# Patient Record
Sex: FEMALE | Race: WHITE
[De-identification: names, ages, dates, MRNs, and addresses within clinical notes are randomized per-mention and may not be internally consistent; named-entity substitution may affect disease eponyms.]

---

## 2017-01-05 ENCOUNTER — PRIOR ORIGINAL RECORDS (OUTPATIENT)
Dept: OTHER | Age: 77
End: 2017-01-05

## 2017-01-06 ENCOUNTER — PRIOR ORIGINAL RECORDS (OUTPATIENT)
Dept: OTHER | Age: 77
End: 2017-01-06

## 2017-03-06 PROBLEM — M17.11 PRIMARY OSTEOARTHRITIS OF RIGHT KNEE: Status: ACTIVE | Noted: 2017-03-06

## 2017-11-01 ENCOUNTER — PRIOR ORIGINAL RECORDS (OUTPATIENT)
Dept: OTHER | Age: 77
End: 2017-11-01

## 2017-11-02 LAB
ALBUMIN: 4.3 G/DL
ALKALINE PHOSPHATATE(ALK PHOS): 56 IU/L
ALT (SGPT): 13 U/L
AST (SGOT): 18 U/L
BILIRUBIN TOTAL: 0.6 MG/DL
BILIRUBIN TOTAL: 0.6 MG/DL
BUN: 23 MG/DL
CALCIUM: 9.7 MG/DL
CHLORIDE: 99 MEQ/L
CHOLESTEROL, TOTAL: 180 MG/DL
CREATININE, SERUM: 0.9 MG/DL
GLUCOSE: 110 MG/DL
GLUCOSE: 110 MG/DL
HDL CHOLESTEROL: 68 MG/DL
LDL CHOLESTEROL: 92 MG/DL
NON-HDL CHOLESTEROL: 112 MG/DL
POTASSIUM, SERUM: 4 MEQ/L
PROTEIN, TOTAL: 7.2 G/DL
SGOT (AST): 18 IU/L
SGPT (ALT): 13 IU/L
SODIUM: 142 MEQ/L
TRIGLYCERIDES: 102 MG/DL

## 2017-12-01 ENCOUNTER — PRIOR ORIGINAL RECORDS (OUTPATIENT)
Dept: OTHER | Age: 77
End: 2017-12-01

## 2018-08-01 PROCEDURE — 84436 ASSAY OF TOTAL THYROXINE: CPT | Performed by: INTERNAL MEDICINE

## 2018-08-13 PROBLEM — Z12.39 BREAST CANCER SCREENING: Status: ACTIVE | Noted: 2018-08-13

## 2018-08-13 PROBLEM — Z01.419 WELL WOMAN EXAM WITH ROUTINE GYNECOLOGICAL EXAM: Status: ACTIVE | Noted: 2018-08-13

## 2018-10-01 PROBLEM — M26.621 ARTHRALGIA OF RIGHT TEMPOROMANDIBULAR JOINT: Status: ACTIVE | Noted: 2018-10-01

## 2018-11-19 ENCOUNTER — PRIOR ORIGINAL RECORDS (OUTPATIENT)
Dept: OTHER | Age: 78
End: 2018-11-19

## 2018-11-23 LAB
ALBUMIN: 4.4 G/DL
ALKALINE PHOSPHATATE(ALK PHOS): 53 IU/L
ALT (SGPT): 15 U/L
AST (SGOT): 17 U/L
BILIRUBIN TOTAL: 0.8 MG/DL
BUN: 25 MG/DL
CALCIUM: 9.8 MG/DL
CHLORIDE: 101 MEQ/L
CHOLESTEROL, TOTAL: 172 MG/DL
CREATININE, SERUM: 0.8 MG/DL
GLUCOSE: 95 MG/DL
GLUCOSE: 95 MG/DL
HDL CHOLESTEROL: 73 MG/DL
LDL CHOLESTEROL: 84 MG/DL
NON-HDL CHOLESTEROL: 99 MG/DL
POTASSIUM, SERUM: 3.8 MEQ/L
PROTEIN, TOTAL: 7 G/DL
SGOT (AST): 17 IU/L
SGPT (ALT): 15 IU/L
SODIUM: 141 MEQ/L
TRIGLYCERIDES: 77 MG/DL

## 2018-11-30 ENCOUNTER — PRIOR ORIGINAL RECORDS (OUTPATIENT)
Dept: OTHER | Age: 78
End: 2018-11-30

## 2018-11-30 ENCOUNTER — MYAURORA ACCOUNT LINK (OUTPATIENT)
Dept: OTHER | Age: 78
End: 2018-11-30

## 2018-12-01 ENCOUNTER — EXTERNAL RECORD (OUTPATIENT)
Dept: HEALTH INFORMATION MANAGEMENT | Facility: OTHER | Age: 78
End: 2018-12-01

## 2018-12-01 ENCOUNTER — PRIOR ORIGINAL RECORDS (OUTPATIENT)
Dept: HEALTH INFORMATION MANAGEMENT | Facility: OTHER | Age: 78
End: 2018-12-01

## 2019-02-12 PROBLEM — K21.00 GASTROESOPHAGEAL REFLUX DISEASE WITH ESOPHAGITIS: Status: ACTIVE | Noted: 2019-02-12

## 2019-02-12 PROBLEM — R42 VERTIGO: Status: ACTIVE | Noted: 2019-02-12

## 2019-02-28 VITALS
HEIGHT: 67 IN | BODY MASS INDEX: 26.68 KG/M2 | SYSTOLIC BLOOD PRESSURE: 126 MMHG | WEIGHT: 170 LBS | HEART RATE: 68 BPM | DIASTOLIC BLOOD PRESSURE: 74 MMHG

## 2019-02-28 VITALS
DIASTOLIC BLOOD PRESSURE: 72 MMHG | BODY MASS INDEX: 27 KG/M2 | WEIGHT: 172 LBS | HEART RATE: 72 BPM | SYSTOLIC BLOOD PRESSURE: 124 MMHG | HEIGHT: 67 IN

## 2019-03-21 RX ORDER — ROSUVASTATIN CALCIUM 5 MG/1
TABLET, COATED ORAL
Qty: 90 TABLET | Refills: 3 | Status: SHIPPED | OUTPATIENT
Start: 2019-03-21 | End: 2020-06-05

## 2019-04-19 RX ORDER — AMLODIPINE BESYLATE 10 MG/1
TABLET ORAL
COMMUNITY
Start: 2013-09-06

## 2019-04-19 RX ORDER — LORATADINE 10 MG/1
TABLET ORAL
COMMUNITY

## 2019-10-25 ENCOUNTER — TELEPHONE (OUTPATIENT)
Dept: CARDIOLOGY | Age: 79
End: 2019-10-25

## 2019-10-25 DIAGNOSIS — I49.3 PVC'S (PREMATURE VENTRICULAR CONTRACTIONS): Primary | ICD-10-CM

## 2019-10-29 ENCOUNTER — LAB ENCOUNTER (OUTPATIENT)
Dept: LAB | Facility: HOSPITAL | Age: 79
End: 2019-10-29
Attending: INTERNAL MEDICINE
Payer: MEDICARE

## 2019-10-29 ENCOUNTER — ANCILLARY PROCEDURE (OUTPATIENT)
Dept: CARDIOLOGY | Age: 79
End: 2019-10-29
Attending: INTERNAL MEDICINE

## 2019-10-29 DIAGNOSIS — E78.2 MIXED HYPERLIPIDEMIA: ICD-10-CM

## 2019-10-29 DIAGNOSIS — I49.3 VENTRICULAR PREMATURE BEATS: Primary | ICD-10-CM

## 2019-10-29 DIAGNOSIS — M81.8 OTHER OSTEOPOROSIS, UNSPECIFIED PATHOLOGICAL FRACTURE PRESENCE: ICD-10-CM

## 2019-10-29 DIAGNOSIS — I10 ESSENTIAL HYPERTENSION: ICD-10-CM

## 2019-10-29 DIAGNOSIS — E07.1 DYSHORMONOGENIC GOITER: ICD-10-CM

## 2019-10-29 DIAGNOSIS — R73.01 IMPAIRED FASTING GLUCOSE: ICD-10-CM

## 2019-10-29 PROCEDURE — 83735 ASSAY OF MAGNESIUM: CPT

## 2019-10-29 PROCEDURE — 80048 BASIC METABOLIC PNL TOTAL CA: CPT

## 2019-10-29 PROCEDURE — 36415 COLL VENOUS BLD VENIPUNCTURE: CPT

## 2019-11-06 ENCOUNTER — TELEPHONE (OUTPATIENT)
Dept: CARDIOLOGY | Age: 79
End: 2019-11-06

## 2019-11-06 DIAGNOSIS — E78.2 MIXED HYPERLIPIDEMIA: ICD-10-CM

## 2019-11-06 DIAGNOSIS — I49.3 PVC'S (PREMATURE VENTRICULAR CONTRACTIONS): Primary | ICD-10-CM

## 2019-11-06 DIAGNOSIS — I10 HYPERTENSION, UNSPECIFIED TYPE: ICD-10-CM

## 2019-11-06 DIAGNOSIS — E78.00 HYPERCHOLESTEREMIA: ICD-10-CM

## 2019-11-06 DIAGNOSIS — E07.1 DYSHORMONOGENIC GOITER: ICD-10-CM

## 2019-11-06 DIAGNOSIS — R73.01 IMPAIRED FASTING GLUCOSE: ICD-10-CM

## 2019-11-06 DIAGNOSIS — I34.1 MITRAL VALVE PROLAPSE: ICD-10-CM

## 2019-11-06 DIAGNOSIS — M81.8 OTHER OSTEOPOROSIS, UNSPECIFIED PATHOLOGICAL FRACTURE PRESENCE: ICD-10-CM

## 2019-11-06 DIAGNOSIS — I10 ESSENTIAL HYPERTENSION: ICD-10-CM

## 2019-11-06 PROCEDURE — 93227 XTRNL ECG REC<48 HR R&I: CPT | Performed by: INTERNAL MEDICINE

## 2019-11-13 ENCOUNTER — HOSPITAL ENCOUNTER (OUTPATIENT)
Dept: CV DIAGNOSTICS | Facility: HOSPITAL | Age: 79
Discharge: HOME | End: 2019-11-13
Attending: INTERNAL MEDICINE
Payer: MEDICARE

## 2019-11-13 DIAGNOSIS — I10 HYPERTENSION, UNSPECIFIED TYPE: ICD-10-CM

## 2019-11-13 DIAGNOSIS — I49.3 PVC'S (PREMATURE VENTRICULAR CONTRACTIONS): ICD-10-CM

## 2019-11-13 DIAGNOSIS — I34.1 MITRAL VALVE PROLAPSE: ICD-10-CM

## 2019-11-13 PROCEDURE — 93306 TTE W/DOPPLER COMPLETE: CPT | Performed by: INTERNAL MEDICINE

## 2019-11-14 ENCOUNTER — TELEPHONE (OUTPATIENT)
Dept: CARDIOLOGY | Age: 79
End: 2019-11-14

## 2019-12-04 ENCOUNTER — TELEPHONE (OUTPATIENT)
Dept: CARDIOLOGY | Age: 79
End: 2019-12-04

## 2019-12-06 ENCOUNTER — OFFICE VISIT (OUTPATIENT)
Dept: CARDIOLOGY | Age: 79
End: 2019-12-06

## 2019-12-06 VITALS
WEIGHT: 170 LBS | HEART RATE: 68 BPM | BODY MASS INDEX: 26.68 KG/M2 | DIASTOLIC BLOOD PRESSURE: 70 MMHG | SYSTOLIC BLOOD PRESSURE: 142 MMHG | HEIGHT: 67 IN

## 2019-12-06 DIAGNOSIS — I34.0 NONRHEUMATIC MITRAL VALVE REGURGITATION: Primary | ICD-10-CM

## 2019-12-06 DIAGNOSIS — E78.2 MIXED HYPERLIPIDEMIA: ICD-10-CM

## 2019-12-06 DIAGNOSIS — I10 ESSENTIAL HYPERTENSION: ICD-10-CM

## 2019-12-06 DIAGNOSIS — I49.3 PVC (PREMATURE VENTRICULAR CONTRACTION): ICD-10-CM

## 2019-12-06 DIAGNOSIS — I34.1 MITRAL VALVE PROLAPSE: ICD-10-CM

## 2019-12-06 PROCEDURE — 99214 OFFICE O/P EST MOD 30 MIN: CPT | Performed by: INTERNAL MEDICINE

## 2019-12-06 ASSESSMENT — PATIENT HEALTH QUESTIONNAIRE - PHQ9
2. FEELING DOWN, DEPRESSED OR HOPELESS: NOT AT ALL
SUM OF ALL RESPONSES TO PHQ9 QUESTIONS 1 AND 2: 0
SUM OF ALL RESPONSES TO PHQ9 QUESTIONS 1 AND 2: 0
1. LITTLE INTEREST OR PLEASURE IN DOING THINGS: NOT AT ALL

## 2019-12-09 ASSESSMENT — ENCOUNTER SYMPTOMS
ALLERGIC/IMMUNOLOGIC COMMENTS: NO NEW FOOD ALLERGIES
BACK PAIN: 1
WEIGHT GAIN: 0
COUGH: 0
HEMATOCHEZIA: 0
CHILLS: 0
HEMOPTYSIS: 0
SUSPICIOUS LESIONS: 0
BRUISES/BLEEDS EASILY: 0
FEVER: 0
WEIGHT LOSS: 0

## 2020-06-05 RX ORDER — ROSUVASTATIN CALCIUM 5 MG/1
TABLET, COATED ORAL
Qty: 90 TABLET | Refills: 2 | Status: SHIPPED | OUTPATIENT
Start: 2020-06-05

## 2020-08-27 ENCOUNTER — TELEPHONE (OUTPATIENT)
Dept: CARDIOLOGY | Age: 80
End: 2020-08-27

## 2020-08-31 ENCOUNTER — TELEPHONE (OUTPATIENT)
Dept: CARDIOLOGY | Age: 80
End: 2020-08-31

## 2020-08-31 DIAGNOSIS — R00.1 BRADYCARDIA, UNSPECIFIED: Primary | ICD-10-CM

## 2020-08-31 PROCEDURE — 93000 ELECTROCARDIOGRAM COMPLETE: CPT | Performed by: INTERNAL MEDICINE

## 2020-09-01 ENCOUNTER — ANCILLARY PROCEDURE (OUTPATIENT)
Dept: CARDIOLOGY | Age: 80
End: 2020-09-01
Attending: INTERNAL MEDICINE

## 2020-09-01 DIAGNOSIS — R00.1 BRADYCARDIA, UNSPECIFIED: ICD-10-CM

## 2020-09-04 ENCOUNTER — TELEPHONE (OUTPATIENT)
Dept: CARDIOLOGY | Age: 80
End: 2020-09-04

## 2020-09-04 PROCEDURE — 93227 XTRNL ECG REC<48 HR R&I: CPT | Performed by: INTERNAL MEDICINE

## 2020-09-08 ENCOUNTER — TELEPHONE (OUTPATIENT)
Dept: CARDIOLOGY | Age: 80
End: 2020-09-08

## 2020-09-08 DIAGNOSIS — I10 ESSENTIAL HYPERTENSION: Primary | ICD-10-CM

## 2020-09-08 DIAGNOSIS — I49.3 PVC (PREMATURE VENTRICULAR CONTRACTION): ICD-10-CM

## 2020-09-08 DIAGNOSIS — I34.0 NONRHEUMATIC MITRAL VALVE REGURGITATION: ICD-10-CM

## 2020-09-08 DIAGNOSIS — I34.1 MITRAL VALVE PROLAPSE: ICD-10-CM

## 2020-09-16 ENCOUNTER — HOSPITAL ENCOUNTER (OUTPATIENT)
Dept: CV DIAGNOSTICS | Facility: HOSPITAL | Age: 80
Discharge: HOME | End: 2020-09-16
Attending: INTERNAL MEDICINE
Payer: MEDICARE

## 2020-09-16 DIAGNOSIS — I49.3 PVC (PREMATURE VENTRICULAR CONTRACTION): ICD-10-CM

## 2020-09-16 DIAGNOSIS — I34.0 NONRHEUMATIC MITRAL VALVE REGURGITATION: ICD-10-CM

## 2020-09-16 DIAGNOSIS — I34.1 MITRAL VALVE PROLAPSE: ICD-10-CM

## 2020-09-16 DIAGNOSIS — I10 HYPERTENSION, ESSENTIAL: ICD-10-CM

## 2020-09-16 PROCEDURE — 93306 TTE W/DOPPLER COMPLETE: CPT | Performed by: INTERNAL MEDICINE

## 2020-09-21 ENCOUNTER — TELEPHONE (OUTPATIENT)
Dept: CARDIOLOGY | Age: 80
End: 2020-09-21

## 2020-09-21 DIAGNOSIS — I49.3 PVC (PREMATURE VENTRICULAR CONTRACTION): Primary | ICD-10-CM

## 2020-09-21 RX ORDER — METOPROLOL SUCCINATE 25 MG/1
12.5 TABLET, EXTENDED RELEASE ORAL DAILY
Qty: 45 TABLET | Refills: 1 | Status: SHIPPED | OUTPATIENT
Start: 2020-09-21 | End: 2021-02-04

## 2020-11-17 ENCOUNTER — TELEPHONE (OUTPATIENT)
Dept: CARDIOLOGY | Age: 80
End: 2020-11-17

## 2020-11-17 DIAGNOSIS — I10 HYPERTENSION, UNSPECIFIED TYPE: Primary | ICD-10-CM

## 2020-11-17 DIAGNOSIS — I34.1 MITRAL VALVE PROLAPSE: ICD-10-CM

## 2020-11-17 DIAGNOSIS — E78.00 HYPERCHOLESTEREMIA: ICD-10-CM

## 2020-11-17 DIAGNOSIS — I49.3 PVC'S (PREMATURE VENTRICULAR CONTRACTIONS): ICD-10-CM

## 2020-12-08 ENCOUNTER — APPOINTMENT (OUTPATIENT)
Dept: CARDIOLOGY | Age: 80
End: 2020-12-08
Attending: INTERNAL MEDICINE

## 2020-12-18 ENCOUNTER — APPOINTMENT (OUTPATIENT)
Dept: CARDIOLOGY | Age: 80
End: 2020-12-18

## 2020-12-22 PROBLEM — I34.0 NONRHEUMATIC MITRAL VALVE REGURGITATION: Status: ACTIVE | Noted: 2019-12-06

## 2021-01-05 ENCOUNTER — ANCILLARY PROCEDURE (OUTPATIENT)
Dept: CARDIOLOGY | Age: 81
End: 2021-01-05
Attending: INTERNAL MEDICINE

## 2021-01-05 DIAGNOSIS — I49.3 PVC (PREMATURE VENTRICULAR CONTRACTION): ICD-10-CM

## 2021-01-07 PROCEDURE — 93227 XTRNL ECG REC<48 HR R&I: CPT | Performed by: INTERNAL MEDICINE

## 2021-01-13 RX ORDER — OMEPRAZOLE 20 MG/1
20 CAPSULE, DELAYED RELEASE ORAL DAILY
COMMUNITY
Start: 2020-12-12

## 2021-01-15 ENCOUNTER — OFFICE VISIT (OUTPATIENT)
Dept: CARDIOLOGY | Age: 81
End: 2021-01-15

## 2021-01-15 VITALS
BODY MASS INDEX: 28.16 KG/M2 | HEART RATE: 50 BPM | SYSTOLIC BLOOD PRESSURE: 122 MMHG | DIASTOLIC BLOOD PRESSURE: 60 MMHG | WEIGHT: 169 LBS | HEIGHT: 65 IN

## 2021-01-15 DIAGNOSIS — I34.0 NONRHEUMATIC MITRAL VALVE REGURGITATION: ICD-10-CM

## 2021-01-15 DIAGNOSIS — I10 ESSENTIAL HYPERTENSION: Primary | ICD-10-CM

## 2021-01-15 DIAGNOSIS — E78.2 MIXED HYPERLIPIDEMIA: ICD-10-CM

## 2021-01-15 DIAGNOSIS — I49.3 FREQUENT PVCS: ICD-10-CM

## 2021-01-15 DIAGNOSIS — I34.1 MITRAL VALVE PROLAPSE: ICD-10-CM

## 2021-01-15 PROCEDURE — 99215 OFFICE O/P EST HI 40 MIN: CPT | Performed by: INTERNAL MEDICINE

## 2021-01-15 SDOH — HEALTH STABILITY: MENTAL HEALTH: HOW OFTEN DO YOU HAVE A DRINK CONTAINING ALCOHOL?: MONTHLY OR LESS

## 2021-01-15 SDOH — HEALTH STABILITY: PHYSICAL HEALTH: ON AVERAGE, HOW MANY DAYS PER WEEK DO YOU ENGAGE IN MODERATE TO STRENUOUS EXERCISE (LIKE A BRISK WALK)?: 0 DAYS

## 2021-01-15 SDOH — HEALTH STABILITY: MENTAL HEALTH: HOW MANY STANDARD DRINKS CONTAINING ALCOHOL DO YOU HAVE ON A TYPICAL DAY?: 1 OR 2

## 2021-01-15 SDOH — HEALTH STABILITY: PHYSICAL HEALTH: ON AVERAGE, HOW MANY MINUTES DO YOU ENGAGE IN EXERCISE AT THIS LEVEL?: 0 MIN

## 2021-01-15 ASSESSMENT — ENCOUNTER SYMPTOMS
WEIGHT LOSS: 0
WEIGHT GAIN: 0
FEVER: 0
BRUISES/BLEEDS EASILY: 0
HEMATOCHEZIA: 0
SUSPICIOUS LESIONS: 0
CHILLS: 0
ALLERGIC/IMMUNOLOGIC COMMENTS: NO NEW FOOD ALLERGIES
COUGH: 0
HEMOPTYSIS: 0

## 2021-01-15 ASSESSMENT — PATIENT HEALTH QUESTIONNAIRE - PHQ9
2. FEELING DOWN, DEPRESSED OR HOPELESS: NOT AT ALL
1. LITTLE INTEREST OR PLEASURE IN DOING THINGS: NOT AT ALL
SUM OF ALL RESPONSES TO PHQ9 QUESTIONS 1 AND 2: 0
SUM OF ALL RESPONSES TO PHQ9 QUESTIONS 1 AND 2: 0
1. LITTLE INTEREST OR PLEASURE IN DOING THINGS: NOT AT ALL
2. FEELING DOWN, DEPRESSED OR HOPELESS: NOT AT ALL
SUM OF ALL RESPONSES TO PHQ9 QUESTIONS 1 AND 2: 0
CLINICAL INTERPRETATION OF PHQ2 SCORE: NO FURTHER SCREENING NEEDED
CLINICAL INTERPRETATION OF PHQ9 SCORE: NO FURTHER SCREENING NEEDED
CLINICAL INTERPRETATION OF PHQ2 SCORE: NO FURTHER SCREENING NEEDED

## 2021-01-18 ENCOUNTER — TELEPHONE (OUTPATIENT)
Dept: CARDIOLOGY | Age: 81
End: 2021-01-18

## 2021-02-04 RX ORDER — METOPROLOL SUCCINATE 25 MG/1
TABLET, EXTENDED RELEASE ORAL
Qty: 45 TABLET | Refills: 3 | Status: SHIPPED | OUTPATIENT
Start: 2021-02-04

## 2021-03-18 ENCOUNTER — IMMUNIZATION (OUTPATIENT)
Dept: LAB | Age: 81
End: 2021-03-18

## 2021-03-18 DIAGNOSIS — Z23 NEED FOR VACCINATION: Primary | ICD-10-CM

## 2021-03-18 PROCEDURE — 91300 COVID 19 PFIZER-BIONTECH: CPT

## 2021-03-18 PROCEDURE — 0001A COVID 19 PFIZER-BIONTECH: CPT

## 2021-03-30 PROBLEM — I70.203 ATHEROSCLEROSIS OF NATIVE ARTERY OF BOTH LOWER EXTREMITIES, WITH UNSPECIFIED PRESENCE OF CLINICAL MANIFESTATION (HCC): Status: ACTIVE | Noted: 2021-03-30

## 2021-03-30 PROBLEM — I70.203 ATHEROSCLEROSIS OF NATIVE ARTERY OF BOTH LOWER EXTREMITIES, WITH UNSPECIFIED PRESENCE OF CLINICAL MANIFESTATION: Status: ACTIVE | Noted: 2021-03-30

## 2021-03-30 PROBLEM — M46.02 SPINAL ENTHESOPATHY, CERVICAL REGION: Status: ACTIVE | Noted: 2021-03-30

## 2021-03-30 PROBLEM — M46.02: Status: ACTIVE | Noted: 2021-03-30

## 2021-04-08 ENCOUNTER — IMMUNIZATION (OUTPATIENT)
Dept: LAB | Age: 81
End: 2021-04-08
Attending: HOSPITALIST

## 2021-04-08 DIAGNOSIS — Z23 NEED FOR VACCINATION: Primary | ICD-10-CM

## 2021-04-08 PROCEDURE — 91300 COVID 19 PFIZER-BIONTECH: CPT

## 2021-04-08 PROCEDURE — 0002A COVID 19 PFIZER-BIONTECH: CPT

## 2021-10-05 PROBLEM — E04.1 NODULAR THYROID DISEASE: Status: ACTIVE | Noted: 2021-10-05

## 2021-10-11 PROBLEM — I63.9 ACUTE ISCHEMIC STROKE (HCC): Status: ACTIVE | Noted: 2021-09-29

## 2021-10-11 PROBLEM — G81.91 ACUTE RIGHT HEMIPARESIS (HCC): Status: ACTIVE | Noted: 2021-09-29

## 2021-10-11 PROBLEM — I10 ACCELERATED HYPERTENSION: Status: ACTIVE | Noted: 2021-09-29

## 2021-10-14 PROBLEM — I48.91 ATRIAL FIBRILLATION, UNSPECIFIED TYPE (HCC): Status: ACTIVE | Noted: 2021-10-14

## 2021-11-23 ENCOUNTER — LAB ENCOUNTER (OUTPATIENT)
Dept: LAB | Facility: HOSPITAL | Age: 81
End: 2021-11-23
Attending: NURSE PRACTITIONER
Payer: MEDICARE

## 2021-11-23 DIAGNOSIS — E78.2 MIXED HYPERLIPIDEMIA: Primary | ICD-10-CM

## 2021-11-23 PROCEDURE — 80053 COMPREHEN METABOLIC PANEL: CPT

## 2021-11-23 PROCEDURE — 36415 COLL VENOUS BLD VENIPUNCTURE: CPT

## 2021-11-23 PROCEDURE — 80061 LIPID PANEL: CPT

## 2021-11-23 PROCEDURE — 85025 COMPLETE CBC W/AUTO DIFF WBC: CPT

## 2021-12-05 ENCOUNTER — HOSPITAL ENCOUNTER (INPATIENT)
Facility: HOSPITAL | Age: 81
LOS: 1 days | Discharge: HOME | DRG: 308 | End: 2021-12-09
Attending: EMERGENCY MEDICINE | Admitting: INTERNAL MEDICINE
Payer: MEDICARE

## 2021-12-05 ENCOUNTER — APPOINTMENT (OUTPATIENT)
Dept: GENERAL RADIOLOGY | Facility: HOSPITAL | Age: 81
DRG: 308 | End: 2021-12-05
Attending: EMERGENCY MEDICINE
Payer: MEDICARE

## 2021-12-05 DIAGNOSIS — R53.1 WEAKNESS GENERALIZED: ICD-10-CM

## 2021-12-05 DIAGNOSIS — I48.91 ATRIAL FIBRILLATION WITH RAPID VENTRICULAR RESPONSE (HCC): Primary | ICD-10-CM

## 2021-12-05 PROBLEM — R79.89 AZOTEMIA: Status: ACTIVE | Noted: 2021-12-05

## 2021-12-05 PROBLEM — E87.6 HYPOKALEMIA: Status: ACTIVE | Noted: 2021-12-05

## 2021-12-05 PROBLEM — R73.9 HYPERGLYCEMIA: Status: ACTIVE | Noted: 2021-12-05

## 2021-12-05 PROCEDURE — 96365 THER/PROPH/DIAG IV INF INIT: CPT

## 2021-12-05 PROCEDURE — 93010 ELECTROCARDIOGRAM REPORT: CPT

## 2021-12-05 PROCEDURE — 71045 X-RAY EXAM CHEST 1 VIEW: CPT | Performed by: EMERGENCY MEDICINE

## 2021-12-05 PROCEDURE — 84443 ASSAY THYROID STIM HORMONE: CPT | Performed by: EMERGENCY MEDICINE

## 2021-12-05 PROCEDURE — 81001 URINALYSIS AUTO W/SCOPE: CPT | Performed by: EMERGENCY MEDICINE

## 2021-12-05 PROCEDURE — 93005 ELECTROCARDIOGRAM TRACING: CPT

## 2021-12-05 PROCEDURE — 96366 THER/PROPH/DIAG IV INF ADDON: CPT

## 2021-12-05 PROCEDURE — 99285 EMERGENCY DEPT VISIT HI MDM: CPT

## 2021-12-05 PROCEDURE — 85025 COMPLETE CBC W/AUTO DIFF WBC: CPT | Performed by: EMERGENCY MEDICINE

## 2021-12-05 PROCEDURE — 84484 ASSAY OF TROPONIN QUANT: CPT | Performed by: EMERGENCY MEDICINE

## 2021-12-05 PROCEDURE — 80053 COMPREHEN METABOLIC PANEL: CPT | Performed by: EMERGENCY MEDICINE

## 2021-12-05 RX ORDER — ASPIRIN 81 MG/1
81 TABLET ORAL DAILY
COMMUNITY

## 2021-12-05 RX ORDER — ASPIRIN 81 MG/1
81 TABLET ORAL DAILY
Status: DISCONTINUED | OUTPATIENT
Start: 2021-12-05 | End: 2021-12-09

## 2021-12-05 RX ORDER — ACETAMINOPHEN 325 MG/1
650 TABLET ORAL EVERY 6 HOURS PRN
Status: DISCONTINUED | OUTPATIENT
Start: 2021-12-05 | End: 2021-12-09

## 2021-12-05 RX ORDER — POTASSIUM CHLORIDE 20 MEQ/1
40 TABLET, EXTENDED RELEASE ORAL ONCE
Status: COMPLETED | OUTPATIENT
Start: 2021-12-05 | End: 2021-12-05

## 2021-12-05 RX ORDER — PANTOPRAZOLE SODIUM 20 MG/1
20 TABLET, DELAYED RELEASE ORAL
Status: DISCONTINUED | OUTPATIENT
Start: 2021-12-05 | End: 2021-12-09

## 2021-12-05 RX ORDER — DULOXETIN HYDROCHLORIDE 30 MG/1
60 CAPSULE, DELAYED RELEASE ORAL DAILY
Status: DISCONTINUED | OUTPATIENT
Start: 2021-12-05 | End: 2021-12-09

## 2021-12-05 RX ORDER — DILTIAZEM HYDROCHLORIDE 120 MG/1
120 CAPSULE, EXTENDED RELEASE ORAL DAILY
COMMUNITY
End: 2021-12-09

## 2021-12-05 RX ORDER — ROSUVASTATIN CALCIUM 10 MG/1
10 TABLET, COATED ORAL NIGHTLY
Status: DISCONTINUED | OUTPATIENT
Start: 2021-12-05 | End: 2021-12-09

## 2021-12-05 NOTE — ED INITIAL ASSESSMENT (HPI)
A/O x 4. Patient presents with generalized weakness over the last week. Patient states she had a stroke in September with no deficits. Denies any pain.

## 2021-12-05 NOTE — ED QUICK NOTES
Ambulated patient to and from BR. Unable to void. C/o being weak. Upon returning to bed, heart rate was noted to be 120-130s.

## 2021-12-05 NOTE — ED PROVIDER NOTES
Patient Seen in: [facility] Emergency Department      History   Patient presents with:  Fatigue    Stated Complaint[facility] feels very weak[facility]     Subjective:   HPI    Patient is a 19-year-old female history of paroxysmal atrial fibrilla noted in HPI. Constitutional and vital signs reviewed. All other systems reviewed and negative except as noted above.     Physical Exam     ED Triage Vitals [12/05/21 0610]   BP (!) 161/98   Pulse (!) 137   Resp 20   Temp 97.7 °F (36.5 °C)   Temp src Narrative: The following orders were created for panel order CBC With Differential With Platelet.   Procedure                               Abnormality         Status                     ---------                               -----------         ------ interpretation of laboratory values, and discussion of case with physician and consultants.   Admission disposition: 12/5/2021  7:12 AM                                  Disposition and Plan     Clinical Impression:  Atrial fibrillation with rapid ventricula

## 2021-12-05 NOTE — H&P
[facility] Hospitalist History and Physical      Patient presents with:  Fatigue       PCP: Ortiz Turner MD      History of Present Illness: Patient is a 80year old female with PMH sig for HTN, HLD, frequent PVCs, mitral regurg, CVA with R h current facility-administered medications on file prior to encounter. aspirin 81 MG Oral Tab EC, Take 81 mg by mouth daily. , Disp: , Rfl:   dilTIAZem HCl ER Beads (TIADYLT ER) 120 MG Oral Capsule SR 24 Hr, Take 120 mg by mouth daily. , Disp: , Rfl:   rosuv focal neurologic deficits  HEENT:  EOMI, PERRLA, OP clear, MMM  Pulm: Lungs clear bilaterally, normal respiratory effort  CV: Tachy, irregular, no murmur. Normal PMI.     Abd: Abdomen soft, nontender, nondistended, no organomegaly, bowel sounds present  MS PMH sig for HTN, HLD, frequent PVCs, mitral regurg, CVA with R hemiparesis, a-fib, and PAD who presented to the ED for further evaluation of generalized fatigue.     # A-fib with RVR  - unclear trigger for uncontrolled rate  - cont cardizem gtt  - cont eliq

## 2021-12-05 NOTE — ED QUICK NOTES
Orders for admission, patient is aware of plan and ready to go upstairs. Any questions, please call ED RN Valdo Salcedo  at extension [phone]. Vaccinated? Yes  Type of COVID test sent:  COVID Suspicion level: Low      Titratable drug(s) infusing:Cardizem  Rate

## 2021-12-05 NOTE — PROGRESS NOTES
12/05/21 0955 12/05/21 0957 12/05/21 0958   Vital Signs   BP (!) 153/102 (!) 167/150 (!) 181/169   MAP (mmHg) 115 153 172   BP Location Left arm Left arm Left arm   BP Method Automatic Automatic Automatic   Patient Position Lying Sitting Sitting      12

## 2021-12-05 NOTE — PROGRESS NOTES
Received pt from ED around 0945 this AM. Patient A&O x4, wears glasses.  at bedside. Admission navigator complete. SPO2 maintained on RA, pt reports mild KIM when ambulating to bathroom. Afib on tele, HR controlled on cardizem gtt. On Eliquis.  Pt de

## 2021-12-06 PROCEDURE — 84132 ASSAY OF SERUM POTASSIUM: CPT | Performed by: INTERNAL MEDICINE

## 2021-12-06 PROCEDURE — 97161 PT EVAL LOW COMPLEX 20 MIN: CPT

## 2021-12-06 PROCEDURE — 80048 BASIC METABOLIC PNL TOTAL CA: CPT | Performed by: INTERNAL MEDICINE

## 2021-12-06 PROCEDURE — 83735 ASSAY OF MAGNESIUM: CPT | Performed by: INTERNAL MEDICINE

## 2021-12-06 PROCEDURE — 85025 COMPLETE CBC W/AUTO DIFF WBC: CPT | Performed by: INTERNAL MEDICINE

## 2021-12-06 PROCEDURE — 97166 OT EVAL MOD COMPLEX 45 MIN: CPT

## 2021-12-06 PROCEDURE — 97116 GAIT TRAINING THERAPY: CPT

## 2021-12-06 PROCEDURE — 97530 THERAPEUTIC ACTIVITIES: CPT

## 2021-12-06 RX ORDER — DILTIAZEM HYDROCHLORIDE 180 MG/1
180 CAPSULE, COATED, EXTENDED RELEASE ORAL DAILY
Qty: 90 CAPSULE | Refills: 1 | Status: SHIPPED | OUTPATIENT
Start: 2021-12-07 | End: 2021-12-09

## 2021-12-06 RX ORDER — DIGOXIN 125 MCG
125 TABLET ORAL DAILY
Qty: 30 TABLET | Refills: 0 | Status: SHIPPED | OUTPATIENT
Start: 2021-12-07

## 2021-12-06 RX ORDER — POTASSIUM CHLORIDE 20 MEQ/1
40 TABLET, EXTENDED RELEASE ORAL EVERY 4 HOURS
Status: COMPLETED | OUTPATIENT
Start: 2021-12-06 | End: 2021-12-06

## 2021-12-06 RX ORDER — DILTIAZEM HYDROCHLORIDE 180 MG/1
180 CAPSULE, EXTENDED RELEASE ORAL DAILY
Status: DISCONTINUED | OUTPATIENT
Start: 2021-12-06 | End: 2021-12-07

## 2021-12-06 RX ORDER — DIGOXIN 125 MCG
125 TABLET ORAL DAILY
Status: DISCONTINUED | OUTPATIENT
Start: 2021-12-07 | End: 2021-12-09

## 2021-12-06 RX ORDER — DIGOXIN 0.25 MG/ML
250 INJECTION INTRAMUSCULAR; INTRAVENOUS ONCE
Status: COMPLETED | OUTPATIENT
Start: 2021-12-06 | End: 2021-12-06

## 2021-12-06 RX ORDER — BENZONATATE 100 MG/1
100 CAPSULE ORAL 3 TIMES DAILY PRN
Status: DISCONTINUED | OUTPATIENT
Start: 2021-12-06 | End: 2021-12-09

## 2021-12-06 NOTE — PROGRESS NOTES
[facility] Hospitalist Progress Note     Mony Negrete Patient Status:  Observation    1940 MRN [id number]   [facility] 2NE-A Attending Angelita Fabian,    Hosp Day # 0 PCP Ingrid Bermudez MD     Sub hours. Cardiac  Recent Labs   Lab 12/05/21  0617   TROP <0.045       Creatinine Kinase  No results for input(s): CK in the last 168 hours. Inflammatory Markers  No results for input(s): CRP, BRODERICK, LDH, DDIMER in the last 168 hours.     Imaging: Imaging

## 2021-12-06 NOTE — OCCUPATIONAL THERAPY NOTE
OCCUPATIONAL THERAPY EVALUATION - INPATIENT     Room Number: 1394/4019-B  Evaluation Date: 12/6/2021  Type of Evaluation: Initial  Presenting Problem: weakness     Physician Order: IP Consult to Occupational Therapy  Reason for Therapy: ADL/IADL Dysfunctio Toileting: not tested as pt declined because she just went to restroom prior to session.  Pt reported no issues or assist needed    Functional Mobility:  Supine to Sit : Supervision  Sit to Stand: Contact guard assist  Sit to supine: supervision; pt also OBJECTIVE  Precautions: Bed/chair alarm  Fall Risk: Standard fall risk    COGNITION  Overall Cognitive Status:  WFL - within functional limits  Following Commands:  follows all commands and directions without difficulty  Safety Judgement:  good awarene use during ADL's by discharge for improved activity tolerance. Pt will tolerate standing at sink for 5 minutes to complete handwashing routine with 1 rest break as needed for improved activity tolerance.      Writer PPE: Surgical mask, goggles, and gloves

## 2021-12-06 NOTE — PHYSICAL THERAPY NOTE
PHYSICAL THERAPY EVALUATION - INPATIENT     Room Number: 0091/2319-B  Evaluation Date: 12/6/2021  Type of Evaluation: Initial  Physician Order: PT Eval and Treat    Presenting Problem: Increased weakness and SOB over past week  Co-Morbidities : Afib, OBJECTIVE  Precautions: Bed/chair alarm  Fall Risk: Standard fall risk    PAIN ASSESSMENT  Ratin          COGNITION  · Overall Cognitive Status:  WFL - within functional limits    RANGE OF MOTION AND STRENGTH ASSESSMENT  See OT note for UE assessme EOB and sat with supervision. Pt preformed sitting EOB to supine with supervision.      Exercise/Education Provided:  Bed mobility  Body mechanics  Energy conservation  Functional activity tolerated  Gait training  Strengthening  Transfer training    Patien ambulate 150 feet with assist device: cane - straight at assistance level: supervision     Goal #4 Pt able to complete 1 flight of stairs safely.     Goal #5    Goal #6    Goal Comments: Goals established on 12/6/2021    PPE worn: gloves, goggles, droplet m

## 2021-12-06 NOTE — CONSULTS
[facility]  Consultation    Vinay Lopez Patient Status:  Observation    1940 MRN [id number]   [facility] 2NE-A Attending José Manuel Rush, DO   Hosp Day # 0 PCP Clare Rios MD     IP Consult to Cardiology  Consult perfor tobacco. She reports that she does not drink alcohol and does not use drugs. Allergies:     Avelox [Moxifloxaci*        Comment:Weakness  Cephalexin              RASH  Ciprofloxacin           OTHER (SEE COMMENTS)    Comment:tendonitis  Morphine light. EOM are normal.   Neck: Neck supple. Cardiovascular: Normal rate. An irregularly irregular rhythm present. No murmur heard. Pulmonary/Chest: Effort normal and breath sounds normal.   Abdominal: Soft.  Bowel sounds are normal.   Neurological: S

## 2021-12-06 NOTE — PLAN OF CARE
Pt alert x 3- vital signs stable- tele a-fib controlled rate-on eliquis-cardizem drip infusing- lung sound clear- sat 98%  Pt denies any pain- cardiology to see  Fall precaution  Pt stable  Problem: CARDIOVASCULAR - ADULT  Goal: Maintains optimal cardiac o appropriate  Outcome: Progressing     Problem: SAFETY ADULT - FALL  Goal: Free from fall injury  Description: INTERVENTIONS:  - Assess pt frequently for physical needs  - Identify cognitive and physical deficits and behaviors that affect risk of falls.   -

## 2021-12-06 NOTE — CM/SW NOTE
PT/OT are recommending HH at hospital discharge. Order/F2F entered. [facility] referrals in [place]. Possible discharge per RN.      Cibolo Roles, MSW, [facility]   / Discharge Planner  [id number]

## 2021-12-06 NOTE — DISCHARGE SUMMARY
[facility] Internal Medicine Hospitalist Discharge Summary     Patient ID:  Elaine Stephenson  80year old  1/16/1940    Admit date: 12/5/2021    Discharge date and time:  12/9/2021    Attending Lisa PT/OT sujata appreciated, recommend home PT/OT     # Cough due to post nasal drip  - pt taken off antihistamines from her cardiologist   - will try tessalon 100 mg po Q8 PRN  - f/u with PCP for further management      Stable for discharge home.       Consults bilaterally, normal respiratory effort  CV: irregular, no murmur.  Normal PMI.    Abd: Abdomen soft, nontender, nondistended, no organomegaly, bowel sounds present  MSK: Full range of motion in extremities, no clubbing, no cyanosis  Skin: no rashes or lesi

## 2021-12-06 NOTE — CM/SW NOTE
12/06/21 1400   CM/SW Referral Data   Referral Source Social Work (self-referral)   Reason for Referral Discharge planning;Psychosocial assessment   Informant Patient   Pertinent Medical Hx   Does patient have an established PCP?  Yes   Patient Info   Pa

## 2021-12-06 NOTE — PLAN OF CARE
Assumed patient care at 0730 this AM. Patient A&O x4, wears glasses. SPO2 maintained on Ra, reports mild KIM. Pt has occasional non-productive cough. Afib on tele. Transitioning from IV diltiazem to PO this Am (home dose increased).  Will monitor HR, possib arrhythmias or at baseline  Description: INTERVENTIONS:  - Continuous cardiac monitoring, monitor vital signs, obtain 12 lead EKG if indicated  - Evaluate effectiveness of antiarrhythmic and heart rate control medications as ordered  - Initiate emergency m caregiver  - Include patient/family/discharge partner in discharge planning  - Arrange for needed discharge resources and transportation as appropriate  - Identify discharge learning needs (meds, wound care, etc)  - Arrange for interpreters to assist at [facility]

## 2021-12-07 ENCOUNTER — APPOINTMENT (OUTPATIENT)
Dept: CV DIAGNOSTICS | Facility: HOSPITAL | Age: 81
DRG: 308 | End: 2021-12-07
Attending: NURSE PRACTITIONER
Payer: MEDICARE

## 2021-12-07 PROCEDURE — 93306 TTE W/DOPPLER COMPLETE: CPT | Performed by: NURSE PRACTITIONER

## 2021-12-07 PROCEDURE — 80048 BASIC METABOLIC PNL TOTAL CA: CPT | Performed by: INTERNAL MEDICINE

## 2021-12-07 RX ORDER — SODIUM CHLORIDE 9 MG/ML
INJECTION, SOLUTION INTRAVENOUS
Status: ACTIVE | OUTPATIENT
Start: 2021-12-08 | End: 2021-12-08

## 2021-12-07 RX ORDER — SODIUM CHLORIDE 9 MG/ML
INJECTION, SOLUTION INTRAVENOUS CONTINUOUS
Status: DISCONTINUED | OUTPATIENT
Start: 2021-12-07 | End: 2021-12-09

## 2021-12-07 RX ORDER — POTASSIUM CHLORIDE 20 MEQ/1
20 TABLET, EXTENDED RELEASE ORAL ONCE
Status: COMPLETED | OUTPATIENT
Start: 2021-12-07 | End: 2021-12-07

## 2021-12-07 RX ORDER — FUROSEMIDE 20 MG/1
20 TABLET ORAL ONCE
Status: COMPLETED | OUTPATIENT
Start: 2021-12-07 | End: 2021-12-07

## 2021-12-07 RX ORDER — DILTIAZEM HYDROCHLORIDE 240 MG/1
240 CAPSULE, COATED, EXTENDED RELEASE ORAL DAILY
Status: DISCONTINUED | OUTPATIENT
Start: 2021-12-07 | End: 2021-12-09

## 2021-12-07 NOTE — PROGRESS NOTES
[facility] Hospitalist Progress Note     Nirali Bennett Patient Status:  Observation    1940 MRN [id number]   [facility] 2NE-A Attending Claudio Young,    Hosp Day # 0 PCP La Nena Mccullough MD     Sub 67.8 mL/min (based on SCr of 0.63 mg/dL). No results for input(s): PTP, INR in the last 168 hours.          COVID-19 Lab Results    COVID-19  Lab Results   Component Value Date    COVID19 Not Detected 12/05/2021    COVID19 NOT DETECTED 08/20/2021    COVI care discussed with patient or family at bedside.     Stanley Mauricio, DO    Supplementary Documentation:     Quality:  · DVT Prophylaxis: eliquis   · CODE status: FULL  · Schneider: No  · Central line: No  · If COVID testing is negative, may discontinue isol

## 2021-12-07 NOTE — PROGRESS NOTES
[facility]     Cardiology Progress Note    Jason Oviedo Patient Status:  Observation    1940 MRN [id number]   [facility] 2NE-A Attending Jessy Izaguirre,    Hosp Day # 0 PCP Roula Thornton MD       SUBJECTIVE:  Remains AF w paresthesia. All other systems reviewed and are negative.       Labs:     Recent Labs   Lab 12/05/21  0617 12/06/21  0646   WBC 7.5 8.1   HGB 12.3 11.9*   MCV 84.2 86.0   .0 282.0       Recent Labs   Lab 12/05/21  0617 12/06/21  0646 12/06/21 2053 (CRESTOR) tab 10 mg, 10 mg, Oral, Nightly  acetaminophen (TYLENOL) tab 650 mg, 650 mg, Oral, Q6H PRN      Assessment/Plan:  · PAF: Admitted with AF with RVR. Today again AF with RVR. Increase Diltiazem to 240 mg, Digoxin.    · Anticoagulation: Eliquis 5 mg

## 2021-12-07 NOTE — PLAN OF CARE
Assumed patient care at 0730 this AM. Patient A&O x4. SPO2 maintained on RA, pt KIM. Weight slightly improved from admission but elevated from baseline per pt. Orders for one-time dose of PO lasix and PO potassium from cardiology.  Afib on tele, HR remains threatening arrhythmias  - Monitor electrolytes and administer replacement therapy as ordered  Outcome: Progressing     Problem: PAIN - ADULT  Goal: Verbalizes/displays adequate comfort level or patient's stated pain goal  Description: INTERVENTIONS:  - En Consider post-discharge preferences of patient/family/discharge partner  - Complete POLST form as appropriate  - Assess patient's ability to be responsible for managing their own health  - Refer to Case Management Department for coordinating discharge plan

## 2021-12-08 ENCOUNTER — APPOINTMENT (OUTPATIENT)
Dept: INTERVENTIONAL RADIOLOGY/VASCULAR | Facility: HOSPITAL | Age: 81
DRG: 308 | End: 2021-12-08
Attending: INTERNAL MEDICINE
Payer: MEDICARE

## 2021-12-08 ENCOUNTER — APPOINTMENT (OUTPATIENT)
Dept: CV DIAGNOSTICS | Facility: HOSPITAL | Age: 81
DRG: 308 | End: 2021-12-08
Attending: INTERNAL MEDICINE
Payer: MEDICARE

## 2021-12-08 PROCEDURE — 93320 DOPPLER ECHO COMPLETE: CPT | Performed by: INTERNAL MEDICINE

## 2021-12-08 PROCEDURE — 85610 PROTHROMBIN TIME: CPT | Performed by: NURSE PRACTITIONER

## 2021-12-08 PROCEDURE — 85730 THROMBOPLASTIN TIME PARTIAL: CPT | Performed by: NURSE PRACTITIONER

## 2021-12-08 PROCEDURE — 85027 COMPLETE CBC AUTOMATED: CPT | Performed by: NURSE PRACTITIONER

## 2021-12-08 PROCEDURE — 93325 DOPPLER ECHO COLOR FLOW MAPG: CPT | Performed by: INTERNAL MEDICINE

## 2021-12-08 PROCEDURE — B24BZZ4 ULTRASONOGRAPHY OF HEART WITH AORTA, TRANSESOPHAGEAL: ICD-10-PCS | Performed by: INTERNAL MEDICINE

## 2021-12-08 PROCEDURE — 99152 MOD SED SAME PHYS/QHP 5/>YRS: CPT

## 2021-12-08 PROCEDURE — 99153 MOD SED SAME PHYS/QHP EA: CPT

## 2021-12-08 PROCEDURE — 80048 BASIC METABOLIC PNL TOTAL CA: CPT | Performed by: NURSE PRACTITIONER

## 2021-12-08 PROCEDURE — 93312 ECHO TRANSESOPHAGEAL: CPT

## 2021-12-08 RX ORDER — WARFARIN SODIUM 7.5 MG/1
7.5 TABLET ORAL
Status: COMPLETED | OUTPATIENT
Start: 2021-12-08 | End: 2021-12-08

## 2021-12-08 RX ORDER — EPLERENONE 25 MG/1
12.5 TABLET, FILM COATED ORAL DAILY
Status: DISCONTINUED | OUTPATIENT
Start: 2021-12-08 | End: 2021-12-09

## 2021-12-08 RX ORDER — FUROSEMIDE 20 MG/1
20 TABLET ORAL DAILY
Status: DISCONTINUED | OUTPATIENT
Start: 2021-12-08 | End: 2021-12-09

## 2021-12-08 RX ORDER — DIGOXIN 125 MCG
125 TABLET ORAL ONCE
Status: DISCONTINUED | OUTPATIENT
Start: 2021-12-08 | End: 2021-12-09

## 2021-12-08 RX ORDER — HEPARIN SODIUM 5000 [USP'U]/ML
60 INJECTION INTRAVENOUS; SUBCUTANEOUS ONCE
Status: COMPLETED | OUTPATIENT
Start: 2021-12-08 | End: 2021-12-08

## 2021-12-08 RX ORDER — METOPROLOL SUCCINATE 25 MG/1
25 TABLET, EXTENDED RELEASE ORAL
Status: DISCONTINUED | OUTPATIENT
Start: 2021-12-09 | End: 2021-12-09

## 2021-12-08 RX ORDER — HEPARIN SODIUM AND DEXTROSE 10000; 5 [USP'U]/100ML; G/100ML
12 INJECTION INTRAVENOUS ONCE
Status: COMPLETED | OUTPATIENT
Start: 2021-12-08 | End: 2021-12-08

## 2021-12-08 RX ORDER — MIDAZOLAM HYDROCHLORIDE 1 MG/ML
INJECTION INTRAMUSCULAR; INTRAVENOUS
Status: COMPLETED
Start: 2021-12-08 | End: 2021-12-08

## 2021-12-08 RX ORDER — HEPARIN SODIUM AND DEXTROSE 10000; 5 [USP'U]/100ML; G/100ML
INJECTION INTRAVENOUS CONTINUOUS
Status: DISCONTINUED | OUTPATIENT
Start: 2021-12-08 | End: 2021-12-09

## 2021-12-08 NOTE — PROCEDURES
Cardiology Transesophageal Echo Note    PRE-PROCEDURE DIAGNOSIS: Atrial fibrillation    PROCEDURE: Transesophageal Echocardiogram.    SEDATION:   Topical spray x 1  Versed: 2 mg  Fentanyl: 75 mcg  I personally supervised the intravenous administration of c

## 2021-12-08 NOTE — CONSULTS
[facility]  Cardiac Electrophysiology Consultation    [facility]    1940 MRN [id number]   [facility] 2NE-A Attending Toñito Hope, DO   Hosp Day # 0 PCP Jazmine Sandra MD     Consults    From drugs. Allergies:        Avelox [Moxifloxaci*        Comment:Weakness  Cephalexin              RASH  Ciprofloxacin           OTHER (SEE COMMENTS)    Comment:tendonitis  Morphine                ITCHING  Vantin                  RASH  Cefpodoxime lb (80.3 kg)  10/11/21 : 174 lb (78.9 kg)      NAD  PERRLA/EOMI  Neck veins not elevated  Carotids- no bruits  CTA bilaterally  Cardiac- irreg irreg S1 S2, MR murmur  Abdomen- Soft,Nontender, normal BS  Extremities- pulses normal  Edema- trace  Mood /Affec 11-13-19: Compared   to the prior study, there has been no significant interval change.          Results     Recent Labs   Lab 12/05/21  0617 12/05/21  0617 12/06/21  0646 12/06/21 2053 12/07/21 0635   *  --  119*  --  116*   BUN 21*  --  17  --  1 disease     Accelerated hypertension     Acute ischemic stroke (HCC)     Acute right hemiparesis (HCC)     Atrial fibrillation, unspecified type (HCC)     Hypokalemia     Azotemia     Hyperglycemia     Atrial fibrillation with rapid ventricular response (H

## 2021-12-08 NOTE — PLAN OF CARE
Problem: CARDIOVASCULAR - ADULT  Goal: Maintains optimal cardiac output and hemodynamic stability  Description: INTERVENTIONS:  - Monitor vital signs, rhythm, and trends  - Monitor for bleeding, hypotension and signs of decreased cardiac output  - Evalua physical deficits and behaviors that affect risk of falls.   - [place] fall precautions as indicated by assessment.  - Educate pt/family on patient safety including physical limitations  - Instruct pt to call for assistance with activity based on assessme

## 2021-12-08 NOTE — PLAN OF CARE
Assumed care of patient at 0730. Patient alert and oriented x4. Oxygenation stable on room air. Tele: atrial fibrillation. On eliquis. HR 80s to low 100s. Cardizem drip maintained per order. VANDANA today. Passed bedside swallow eval post VANDANA.  Heparin gtt init replacement therapy as ordered  Outcome: Progressing     Problem: PAIN - ADULT  Goal: Verbalizes/displays adequate comfort level or patient's stated pain goal  Description: INTERVENTIONS:  - Encourage pt to monitor pain and request assistance  - Assess pancho partner  - Complete POLST form as appropriate  - Assess patient's ability to be responsible for managing their own health  - Refer to Case Management Department for coordinating discharge planning if the patient needs post-hospital services based on physic

## 2021-12-08 NOTE — PROGRESS NOTES
Patient here for elective VANDANA/CV with Dr Liss Cat. Informed consent obtained by CTU RN prior to arrival. Spoke with patient and her , at length, about procedure expectations. Patient arrived with cardizem infusing and IV infiltrated.  Site red, swollen, p

## 2021-12-08 NOTE — PROGRESS NOTES
[facility] Hospitalist Progress Note     Muna Repress Patient Status:  Observation    1940 MRN [id number]   [facility] 2NE-A Attending Jenni Morin,    Hosp Day # 0 PCP David White MD     Sub 67.8 mL/min (based on SCr of 0.63 mg/dL). No results for input(s): PTP, INR in the last 168 hours.          COVID-19 Lab Results    COVID-19  Lab Results   Component Value Date    COVID19 Not Detected 12/05/2021    COVID19 NOT DETECTED 08/20/2021    COVI control as above  - PT/OT eval appreciated, recommend home PT/OT    # Cough due to post nasal drip  - pt taken off antihistamines from her cardiologist   - will try tessalon 100 mg po Q8 PRN  - f/u with PCP for further management     Plan of care: awaiting

## 2021-12-08 NOTE — PLAN OF CARE
Pt alert 3 -forgetful at times- vital signs stable-tele a-fib- rate 70- 120's- cardizem drip resumed today d/t elevated heart rate. pt on eliquis.    Plan for cardioversion in am  Consent obtained  con't to monitor vital signs/ltele/sat  Pt stable  Problem: Manage/alleviate anxiety  - Utilize distraction and/or relaxation techniques  - Monitor for opioid side effects  - Notify MD/LIP if interventions unsuccessful or patient reports new pain  - Anticipate increased pain with activity and pre-medicate as approp

## 2021-12-08 NOTE — PROGRESS NOTES
[facility]     Cardiology Progress Note    [facility] Patient Status:  Inpatient    1940 MRN [id number]   [facility] 2NE-A Attending Toñito Hope,    Hosp Day # 0 PCP Jazmine Sandra MD       SUBJECTIVE:  Still remains difficulty with urination. Neuro:  Denies any headaches, focal weakness or paresthesia. All other systems reviewed and are negative.       Labs:     Recent Labs   Lab 12/05/21  0617 12/06/21  0646   WBC 7.5 8.1   HGB 12.3 11.9*   MCV 84.2 86.0    infusion, , Intravenous, Continuous  benzonatate (TESSALON) cap 100 mg, 100 mg, Oral, TID PRN  [Held by provider] digoxin (LANOXIN) tab 125 mcg, 125 mcg, Oral, Daily  apixaban (ELIQUIS) tab 5 mg, 5 mg, Oral, BID  aspirin EC tab 81 mg, 81 mg, Oral, Daily  D

## 2021-12-09 VITALS
HEIGHT: 66 IN | SYSTOLIC BLOOD PRESSURE: 90 MMHG | DIASTOLIC BLOOD PRESSURE: 53 MMHG | TEMPERATURE: 98 F | WEIGHT: 174.63 LBS | RESPIRATION RATE: 18 BRPM | BODY MASS INDEX: 28.06 KG/M2 | HEART RATE: 73 BPM | OXYGEN SATURATION: 93 %

## 2021-12-09 PROCEDURE — 80048 BASIC METABOLIC PNL TOTAL CA: CPT | Performed by: INTERNAL MEDICINE

## 2021-12-09 PROCEDURE — 97535 SELF CARE MNGMENT TRAINING: CPT

## 2021-12-09 PROCEDURE — 85730 THROMBOPLASTIN TIME PARTIAL: CPT | Performed by: INTERNAL MEDICINE

## 2021-12-09 PROCEDURE — 85610 PROTHROMBIN TIME: CPT | Performed by: NURSE PRACTITIONER

## 2021-12-09 PROCEDURE — 85049 AUTOMATED PLATELET COUNT: CPT | Performed by: NURSE PRACTITIONER

## 2021-12-09 PROCEDURE — 97530 THERAPEUTIC ACTIVITIES: CPT

## 2021-12-09 RX ORDER — ENOXAPARIN SODIUM 100 MG/ML
80 INJECTION SUBCUTANEOUS ONCE
Status: COMPLETED | OUTPATIENT
Start: 2021-12-09 | End: 2021-12-09

## 2021-12-09 RX ORDER — METOPROLOL SUCCINATE 25 MG/1
25 TABLET, EXTENDED RELEASE ORAL
Qty: 30 TABLET | Refills: 3 | Status: SHIPPED | OUTPATIENT
Start: 2021-12-10

## 2021-12-09 RX ORDER — DILTIAZEM HYDROCHLORIDE 240 MG/1
240 CAPSULE, COATED, EXTENDED RELEASE ORAL DAILY
Qty: 30 CAPSULE | Refills: 3 | Status: SHIPPED | OUTPATIENT
Start: 2021-12-10

## 2021-12-09 RX ORDER — FUROSEMIDE 20 MG/1
20 TABLET ORAL DAILY
Qty: 30 TABLET | Refills: 3 | Status: SHIPPED | OUTPATIENT
Start: 2021-12-10

## 2021-12-09 RX ORDER — WARFARIN SODIUM 5 MG/1
TABLET ORAL
Qty: 10 TABLET | Refills: 0 | Status: SHIPPED | OUTPATIENT
Start: 2021-12-09

## 2021-12-09 RX ORDER — EPLERENONE 25 MG/1
12.5 TABLET, FILM COATED ORAL DAILY
Qty: 45 TABLET | Refills: 1 | Status: SHIPPED | OUTPATIENT
Start: 2021-12-10

## 2021-12-09 RX ORDER — WARFARIN SODIUM 7.5 MG/1
7.5 TABLET ORAL NIGHTLY
Status: DISCONTINUED | OUTPATIENT
Start: 2021-12-09 | End: 2021-12-09

## 2021-12-09 RX ORDER — ENOXAPARIN SODIUM 100 MG/ML
1 INJECTION SUBCUTANEOUS 2 TIMES DAILY
Qty: 8 EACH | Refills: 0 | Status: SHIPPED | OUTPATIENT
Start: 2021-12-09

## 2021-12-09 NOTE — PLAN OF CARE
Assumed care of patient at 0730. Alert and oriented with moments of confusion. Atrial fibrillation on telemetry. Heart rate controlled. Heparin gtt infusing per protocol. Denies pain. Plan of care updated with patient.     Addendum: Heparin gtt discont Progressing     Problem: PAIN - ADULT  Goal: Verbalizes/displays adequate comfort level or patient's stated pain goal  Description: INTERVENTIONS:  - Encourage pt to monitor pain and request assistance  - Assess pain using appropriate pain scale  - Adminis appropriate  - Assess patient's ability to be responsible for managing their own health  - Refer to Case Management Department for coordinating discharge planning if the patient needs post-hospital services based on physician/LIP order or complex needs rel

## 2021-12-09 NOTE — PROGRESS NOTES
[facility]     Cardiology Progress Note    Ángel Castro Patient Status:  Inpatient    1940 MRN [id number]   [facility] 2NE-A Attending Cuauhtemoc Flowers, DO   Hosp Day # 1 PCP Lucina Adame MD       SUBJECTIVE:  Remains AF wit or change in bowel habits. : Denies any difficulty with urination. Neuro:  Denies any headaches, focal weakness or paresthesia. All other systems reviewed and are negative.       Labs:     Recent Labs   Lab 12/05/21  0617 12/06/21  0646 12/08/21  16 without evidence for mobile atheromata or thrombus. · No pericardial effusion.     Echo: 12/7/21:  1. Left ventricle: The cavity size was normal. Wall thickness was normal.      Systolic function was moderately to markedly reduced.  The estimated      ejec    Systolic function was normal. The estimated ejection fraction was 60-65%.      There was no diagnostic evidence for regional wall motion abnormalities.      Doppler parameters are consistent with abnormal left ventricular      relaxation - grade 1 pernell Oral, Daily  DULoxetine (CYMBALTA) DR particles cap 60 mg, 60 mg, Oral, Daily  pantoprazole (PROTONIX) EC tab 20 mg, 20 mg, Oral, QAM AC  rosuvastatin (CRESTOR) tab 10 mg, 10 mg, Oral, Nightly  acetaminophen (TYLENOL) tab 650 mg, 650 mg, Oral, Q6H PRN APN), plan on re-evaluation VANDANA/DCCV after 4 weeks of therapeutic anticoagulation with coumadin and will bridge with subcutaneous lovenox. If rate controlled with ambulation later today will plan for discharge.     Harpal Manrique MD

## 2021-12-09 NOTE — OCCUPATIONAL THERAPY NOTE
OCCUPATIONAL THERAPY TREATMENT NOTE - INPATIENT     Room Number: 3107/8729-W  Session: 1  Number of Visits to Meet Established Goals: 5    History:  Patient is a 80year old female admitted on 12/5/2021 with Presenting Problem: weakness .  PMH of stroke in after activity but felt good enough to sit up in the chair for breakfast. Pt vitals remained stable.      Education provided: Role of OT, Safety with ADL and transfers, Overview of identified deficits, Activity recommendations discharge planning    Maryellen Bridges 2-3 energy conservation strategies to use during ADL's by discharge for improved activity tolerance.   Pt will tolerate standing at sink for 5 minutes to complete handwashing routine with 1 rest break as needed for improved activity tolerance. - good progre

## 2021-12-09 NOTE — PLAN OF CARE
Received patient at [address]. Patient A/O x 4, forgetful at times. AFib on tele, HR: 80-90s at rest. 100-10s with activity. O2 saturation 98% on RA. Breath sounds clear. No C/O chest pain or SOB. Patient voiding with no issue. SBA with cane/walker.  Bed is therapy as ordered  Outcome: Progressing     Problem: PAIN - ADULT  Goal: Verbalizes/displays adequate comfort level or patient's stated pain goal  Description: INTERVENTIONS:  - Encourage pt to monitor pain and request assistance  - Assess pain using appr Complete POLST form as appropriate  - Assess patient's ability to be responsible for managing their own health  - Refer to Case Management Department for coordinating discharge planning if the patient needs post-hospital services based on physician/LIP ord

## 2021-12-10 NOTE — PROGRESS NOTES
Discharge instructions discussed and sent  with patient. IVs discontinued. Discharged Home via Wheelchair. Accompanied by Support staff  Belongings Taken by patient/family.

## 2021-12-13 ENCOUNTER — HOSPITAL ENCOUNTER (OUTPATIENT)
Dept: LAB | Facility: HOSPITAL | Age: 81
Discharge: HOME | End: 2021-12-13
Attending: INTERNAL MEDICINE
Payer: MEDICARE

## 2021-12-13 ENCOUNTER — PATIENT OUTREACH (OUTPATIENT)
Dept: CASE MANAGEMENT | Age: 81
End: 2021-12-13

## 2021-12-13 DIAGNOSIS — I48.19 PERSISTENT ATRIAL FIBRILLATION (HCC): ICD-10-CM

## 2021-12-13 PROCEDURE — 85610 PROTHROMBIN TIME: CPT

## 2021-12-17 ENCOUNTER — HOSPITAL ENCOUNTER (OUTPATIENT)
Dept: LAB | Facility: HOSPITAL | Age: 81
Discharge: HOME | End: 2021-12-17
Attending: INTERNAL MEDICINE
Payer: MEDICARE

## 2021-12-17 DIAGNOSIS — I48.19 PERSISTENT ATRIAL FIBRILLATION (HCC): ICD-10-CM

## 2021-12-17 PROCEDURE — 80048 BASIC METABOLIC PNL TOTAL CA: CPT | Performed by: NURSE PRACTITIONER

## 2021-12-17 PROCEDURE — 85610 PROTHROMBIN TIME: CPT

## 2021-12-17 PROCEDURE — 36415 COLL VENOUS BLD VENIPUNCTURE: CPT | Performed by: NURSE PRACTITIONER

## 2021-12-21 ENCOUNTER — HOSPITAL ENCOUNTER (OUTPATIENT)
Dept: LAB | Facility: HOSPITAL | Age: 81
Discharge: HOME | End: 2021-12-21
Attending: INTERNAL MEDICINE
Payer: MEDICARE

## 2021-12-21 DIAGNOSIS — I48.19 PERSISTENT ATRIAL FIBRILLATION (HCC): ICD-10-CM

## 2021-12-21 PROCEDURE — 85610 PROTHROMBIN TIME: CPT

## 2021-12-28 ENCOUNTER — HOSPITAL ENCOUNTER (OUTPATIENT)
Dept: LAB | Facility: HOSPITAL | Age: 81
Discharge: HOME | End: 2021-12-28
Attending: INTERNAL MEDICINE
Payer: MEDICARE

## 2021-12-28 DIAGNOSIS — I48.19 PERSISTENT ATRIAL FIBRILLATION (HCC): ICD-10-CM

## 2021-12-28 LAB — INR BLDC: 2.6 (ref 0.9–1.1)

## 2021-12-28 PROCEDURE — 85610 PROTHROMBIN TIME: CPT

## 2022-01-03 ENCOUNTER — HOSPITAL ENCOUNTER (OUTPATIENT)
Dept: LAB | Facility: HOSPITAL | Age: 82
Discharge: HOME | End: 2022-01-03
Attending: INTERNAL MEDICINE
Payer: MEDICARE

## 2022-01-03 DIAGNOSIS — I48.19 PERSISTENT ATRIAL FIBRILLATION (HCC): ICD-10-CM

## 2022-01-03 LAB — INR BLDC: 3.8 (ref 0.9–1.1)

## 2022-01-03 PROCEDURE — 85610 PROTHROMBIN TIME: CPT

## 2022-01-11 ENCOUNTER — HOSPITAL ENCOUNTER (OUTPATIENT)
Dept: LAB | Facility: HOSPITAL | Age: 82
Discharge: HOME | End: 2022-01-11
Attending: INTERNAL MEDICINE
Payer: MEDICARE

## 2022-01-11 DIAGNOSIS — I48.19 PERSISTENT ATRIAL FIBRILLATION (HCC): ICD-10-CM

## 2022-01-11 LAB — INR BLDC: 3.6 (ref 0.9–1.1)

## 2022-01-11 PROCEDURE — 85610 PROTHROMBIN TIME: CPT

## 2022-01-14 ENCOUNTER — APPOINTMENT (OUTPATIENT)
Dept: CARDIOLOGY | Age: 82
End: 2022-01-14

## 2022-01-18 ENCOUNTER — HOSPITAL ENCOUNTER (OUTPATIENT)
Dept: LAB | Facility: HOSPITAL | Age: 82
Discharge: HOME | End: 2022-01-18
Attending: INTERNAL MEDICINE
Payer: MEDICARE

## 2022-01-18 ENCOUNTER — ORDER TRANSCRIPTION (OUTPATIENT)
Dept: ADMINISTRATIVE | Facility: HOSPITAL | Age: 82
End: 2022-01-18

## 2022-01-18 DIAGNOSIS — I10 ESSENTIAL HYPERTENSION: ICD-10-CM

## 2022-01-18 DIAGNOSIS — I48.19 PERSISTENT ATRIAL FIBRILLATION (HCC): ICD-10-CM

## 2022-01-18 DIAGNOSIS — I48.19 PERSISTENT ATRIAL FIBRILLATION (HCC): Primary | ICD-10-CM

## 2022-01-18 LAB — INR BLDC: 2.9 (ref 0.9–1.1)

## 2022-01-18 PROCEDURE — 85610 PROTHROMBIN TIME: CPT

## 2022-01-25 ENCOUNTER — HOSPITAL ENCOUNTER (OUTPATIENT)
Dept: LAB | Facility: HOSPITAL | Age: 82
Discharge: HOME | End: 2022-01-25
Attending: INTERNAL MEDICINE
Payer: MEDICARE

## 2022-01-25 DIAGNOSIS — I48.19 PERSISTENT ATRIAL FIBRILLATION (HCC): ICD-10-CM

## 2022-01-25 LAB — INR BLDC: 2.3 (ref 0.9–1.1)

## 2022-01-25 PROCEDURE — 85610 PROTHROMBIN TIME: CPT

## 2022-02-08 NOTE — IMAGING NOTE
Left message with call back number regarding  instructions  in preparation for gated coronary arteries study procedure:  Check-in in the [address] side out-patient registration at 0800  Hold caffeine, decaf drink  and chocolate 12 hours prior. Emphasized taking morning doses of Metoprolol, Cardizem and Digoxin before coming in.  Critical access hospital

## 2022-02-11 ENCOUNTER — HOSPITAL ENCOUNTER (OUTPATIENT)
Dept: CT IMAGING | Facility: HOSPITAL | Age: 82
Discharge: HOME | End: 2022-02-11
Attending: INTERNAL MEDICINE
Payer: MEDICARE

## 2022-02-11 VITALS — DIASTOLIC BLOOD PRESSURE: 52 MMHG | SYSTOLIC BLOOD PRESSURE: 100 MMHG | HEART RATE: 36 BPM

## 2022-02-11 DIAGNOSIS — I10 ESSENTIAL HYPERTENSION: ICD-10-CM

## 2022-02-11 DIAGNOSIS — I48.19 PERSISTENT ATRIAL FIBRILLATION (HCC): ICD-10-CM

## 2022-02-11 LAB — CREAT BLD-MCNC: 0.8 MG/DL

## 2022-02-11 PROCEDURE — 0502T CTA FRACTIONAL FLOW RESERVE ANALYSIS (CPT=0503T/0502T): CPT | Performed by: INTERNAL MEDICINE

## 2022-02-11 PROCEDURE — 0503T CTA FRACTIONAL FLOW RESERVE ANALYSIS (CPT=0503T/0502T): CPT | Performed by: INTERNAL MEDICINE

## 2022-02-11 PROCEDURE — 75574 CT ANGIO HRT W/3D IMAGE: CPT | Performed by: INTERNAL MEDICINE

## 2022-02-11 PROCEDURE — 82565 ASSAY OF CREATININE: CPT

## 2022-02-11 RX ORDER — NITROGLYCERIN 0.4 MG/1
TABLET SUBLINGUAL
Status: COMPLETED
Start: 2022-02-11 | End: 2022-02-11

## 2022-02-11 RX ORDER — IOHEXOL 350 MG/ML
70 INJECTION, SOLUTION INTRAVENOUS
Status: COMPLETED | OUTPATIENT
Start: 2022-02-11 | End: 2022-02-11

## 2022-02-11 RX ADMIN — IOHEXOL 70 ML: 350 INJECTION, SOLUTION INTRAVENOUS at 09:19:00

## 2022-02-11 NOTE — IMAGING NOTE
Pt scheduled for CT gated coronary. Denies any contrast allergies. .  Room # 4 used for scanning. O2 2L NC  HR 36 during scan at 0919   72 ml of 0.9 NS given. 75 ml of contrast given. Tolerated exam well.  Instructed to hydrate well to help clear contrast.

## 2022-02-15 ENCOUNTER — HOSPITAL ENCOUNTER (OUTPATIENT)
Dept: LAB | Facility: HOSPITAL | Age: 82
Discharge: HOME | End: 2022-02-15
Attending: INTERNAL MEDICINE
Payer: MEDICARE

## 2022-02-15 DIAGNOSIS — I48.19 PERSISTENT ATRIAL FIBRILLATION (HCC): ICD-10-CM

## 2022-02-15 LAB — INR BLDC: 1.9 (ref 0.9–1.1)

## 2022-02-15 PROCEDURE — 85610 PROTHROMBIN TIME: CPT

## 2022-02-28 ENCOUNTER — HOSPITAL ENCOUNTER (OUTPATIENT)
Dept: LAB | Facility: HOSPITAL | Age: 82
Discharge: HOME | End: 2022-02-28
Attending: INTERNAL MEDICINE
Payer: MEDICARE

## 2022-02-28 DIAGNOSIS — I48.19 PERSISTENT ATRIAL FIBRILLATION (HCC): ICD-10-CM

## 2022-02-28 LAB — INR BLDC: 1.8 (ref 0.9–1.1)

## 2022-02-28 PROCEDURE — 85610 PROTHROMBIN TIME: CPT

## 2022-03-09 ENCOUNTER — HOSPITAL ENCOUNTER (OUTPATIENT)
Dept: LAB | Facility: HOSPITAL | Age: 82
Discharge: HOME | End: 2022-03-09
Attending: INTERNAL MEDICINE
Payer: MEDICARE

## 2022-03-09 DIAGNOSIS — I48.19 PERSISTENT ATRIAL FIBRILLATION (HCC): ICD-10-CM

## 2022-03-09 DIAGNOSIS — R73.01 IMPAIRED FASTING GLUCOSE: ICD-10-CM

## 2022-03-09 LAB — INR BLDC: 2.3 (ref 0.9–1.1)

## 2022-03-09 PROCEDURE — 85610 PROTHROMBIN TIME: CPT

## 2022-03-23 ENCOUNTER — HOSPITAL ENCOUNTER (OUTPATIENT)
Dept: LAB | Facility: HOSPITAL | Age: 82
Discharge: HOME | End: 2022-03-23
Attending: INTERNAL MEDICINE
Payer: MEDICARE

## 2022-03-23 DIAGNOSIS — I48.19 PERSISTENT ATRIAL FIBRILLATION (HCC): ICD-10-CM

## 2022-03-23 LAB — INR BLDC: 2.5 (ref 0.9–1.1)

## 2022-03-23 PROCEDURE — 85610 PROTHROMBIN TIME: CPT

## 2022-04-19 ENCOUNTER — HOSPITAL ENCOUNTER (OUTPATIENT)
Dept: LAB | Facility: HOSPITAL | Age: 82
Discharge: HOME | End: 2022-04-19
Attending: INTERNAL MEDICINE
Payer: MEDICARE

## 2022-04-19 DIAGNOSIS — I48.19 PERSISTENT ATRIAL FIBRILLATION (HCC): ICD-10-CM

## 2022-04-19 LAB — INR BLDC: 2.7 (ref 0.9–1.1)

## 2022-04-19 PROCEDURE — 85610 PROTHROMBIN TIME: CPT

## 2022-05-13 ENCOUNTER — HOSPITAL ENCOUNTER (OUTPATIENT)
Dept: LAB | Facility: HOSPITAL | Age: 82
Discharge: HOME | End: 2022-05-13
Attending: INTERNAL MEDICINE
Payer: MEDICARE

## 2022-05-13 ENCOUNTER — LAB ENCOUNTER (OUTPATIENT)
Dept: LAB | Facility: HOSPITAL | Age: 82
End: 2022-05-13
Attending: INTERNAL MEDICINE
Payer: MEDICARE

## 2022-05-13 DIAGNOSIS — E78.2 MIXED HYPERLIPIDEMIA: ICD-10-CM

## 2022-05-13 DIAGNOSIS — I48.19 PERSISTENT ATRIAL FIBRILLATION (HCC): ICD-10-CM

## 2022-05-13 DIAGNOSIS — E87.6 HYPOPOTASSEMIA: ICD-10-CM

## 2022-05-13 DIAGNOSIS — I10 ESSENTIAL HYPERTENSION, MALIGNANT: Primary | ICD-10-CM

## 2022-05-13 LAB
ALBUMIN SERPL-MCNC: 3.2 G/DL (ref 3.4–5)
ALBUMIN/GLOB SERPL: 0.9 {RATIO} (ref 1–2)
ALP LIVER SERPL-CCNC: 55 U/L
ALT SERPL-CCNC: 17 U/L
ANION GAP SERPL CALC-SCNC: 3 MMOL/L (ref 0–18)
AST SERPL-CCNC: 14 U/L (ref 15–37)
BILIRUB SERPL-MCNC: 0.7 MG/DL (ref 0.1–2)
BUN BLD-MCNC: 21 MG/DL (ref 7–18)
CALCIUM BLD-MCNC: 9.1 MG/DL (ref 8.5–10.1)
CHLORIDE SERPL-SCNC: 101 MMOL/L (ref 98–112)
CHOLEST SERPL-MCNC: 127 MG/DL (ref ?–200)
CO2 SERPL-SCNC: 33 MMOL/L (ref 21–32)
CREAT BLD-MCNC: 0.87 MG/DL
FASTING PATIENT LIPID ANSWER: YES
FASTING STATUS PATIENT QL REPORTED: YES
GLOBULIN PLAS-MCNC: 3.6 G/DL (ref 2.8–4.4)
GLUCOSE BLD-MCNC: 105 MG/DL (ref 70–99)
HDLC SERPL-MCNC: 48 MG/DL (ref 40–59)
INR BLDC: 1.6 (ref 0.9–1.1)
LDLC SERPL CALC-MCNC: 61 MG/DL (ref ?–100)
NONHDLC SERPL-MCNC: 79 MG/DL (ref ?–130)
OSMOLALITY SERPL CALC.SUM OF ELEC: 287 MOSM/KG (ref 275–295)
POTASSIUM SERPL-SCNC: 3.8 MMOL/L (ref 3.5–5.1)
PROT SERPL-MCNC: 6.8 G/DL (ref 6.4–8.2)
SODIUM SERPL-SCNC: 137 MMOL/L (ref 136–145)
TRIGL SERPL-MCNC: 97 MG/DL (ref 30–149)
VLDLC SERPL CALC-MCNC: 14 MG/DL (ref 0–30)

## 2022-05-13 PROCEDURE — 80053 COMPREHEN METABOLIC PANEL: CPT

## 2022-05-13 PROCEDURE — 36415 COLL VENOUS BLD VENIPUNCTURE: CPT

## 2022-05-13 PROCEDURE — 80061 LIPID PANEL: CPT

## 2022-05-13 PROCEDURE — 85610 PROTHROMBIN TIME: CPT

## 2022-05-17 ENCOUNTER — ORDER TRANSCRIPTION (OUTPATIENT)
Dept: ADMINISTRATIVE | Facility: HOSPITAL | Age: 82
End: 2022-05-17

## 2022-05-17 DIAGNOSIS — Z01.812 PRE-PROCEDURE LAB EXAM: Primary | ICD-10-CM

## 2022-05-19 ENCOUNTER — HOSPITAL ENCOUNTER (OUTPATIENT)
Dept: LAB | Facility: HOSPITAL | Age: 82
Discharge: HOME | End: 2022-05-19
Attending: INTERNAL MEDICINE
Payer: MEDICARE

## 2022-05-19 DIAGNOSIS — I48.19 PERSISTENT ATRIAL FIBRILLATION (HCC): ICD-10-CM

## 2022-05-19 LAB — INR BLDC: 2.3 (ref 0.9–1.1)

## 2022-05-19 PROCEDURE — 85610 PROTHROMBIN TIME: CPT

## 2022-05-27 RX ORDER — LORATADINE 10 MG/1
10 TABLET ORAL DAILY
COMMUNITY

## 2022-05-27 NOTE — IVS NOTE
Called and emailed [facility] that the patient will not have 4 weeks of INR's over 2.0 by the procedure. Want to make sure doctor is aware and OK with that.

## 2022-05-29 ENCOUNTER — LAB ENCOUNTER (OUTPATIENT)
Dept: LAB | Facility: HOSPITAL | Age: 82
End: 2022-05-29
Attending: INTERNAL MEDICINE
Payer: MEDICARE

## 2022-05-29 DIAGNOSIS — Z01.812 PRE-PROCEDURE LAB EXAM: ICD-10-CM

## 2022-05-30 LAB — SARS-COV-2 RNA RESP QL NAA+PROBE: NOT DETECTED

## 2022-06-01 ENCOUNTER — HOSPITAL ENCOUNTER (OUTPATIENT)
Dept: INTERVENTIONAL RADIOLOGY/VASCULAR | Facility: HOSPITAL | Age: 82
Discharge: HOME | End: 2022-06-01
Attending: INTERNAL MEDICINE | Admitting: INTERNAL MEDICINE
Payer: MEDICARE

## 2022-06-01 ENCOUNTER — HOSPITAL ENCOUNTER (OUTPATIENT)
Dept: CV DIAGNOSTICS | Facility: HOSPITAL | Age: 82
Discharge: HOME | End: 2022-06-01
Attending: INTERNAL MEDICINE
Payer: MEDICARE

## 2022-06-01 VITALS
DIASTOLIC BLOOD PRESSURE: 64 MMHG | OXYGEN SATURATION: 100 % | HEART RATE: 75 BPM | RESPIRATION RATE: 14 BRPM | SYSTOLIC BLOOD PRESSURE: 132 MMHG | HEIGHT: 65 IN | TEMPERATURE: 98 F | WEIGHT: 149 LBS | BODY MASS INDEX: 24.83 KG/M2

## 2022-06-01 DIAGNOSIS — I48.91 A-FIB (HCC): ICD-10-CM

## 2022-06-01 LAB — INR: 1.7 (ref 0.8–1.3)

## 2022-06-01 PROCEDURE — 85610 PROTHROMBIN TIME: CPT | Performed by: INTERNAL MEDICINE

## 2022-06-01 RX ORDER — MIDAZOLAM HYDROCHLORIDE 1 MG/ML
INJECTION INTRAMUSCULAR; INTRAVENOUS
Status: DISCONTINUED
Start: 2022-06-01 | End: 2022-06-01 | Stop reason: WASHOUT

## 2022-06-01 NOTE — PROGRESS NOTES
INR 1.7, procedure cancelled. Dr Summer Regalado at bedside speaking with family. Pt will call Coumadin clinic and update. IV removed with catheter intact. Office will call to reschedule.

## 2022-06-08 ENCOUNTER — HOSPITAL ENCOUNTER (OUTPATIENT)
Dept: LAB | Facility: HOSPITAL | Age: 82
Discharge: HOME | End: 2022-06-08
Attending: INTERNAL MEDICINE
Payer: MEDICARE

## 2022-06-08 DIAGNOSIS — I48.19 PERSISTENT ATRIAL FIBRILLATION (HCC): ICD-10-CM

## 2022-06-08 LAB — INR BLDC: 3.5 (ref 0.9–1.1)

## 2022-06-08 PROCEDURE — 85610 PROTHROMBIN TIME: CPT

## 2022-06-15 ENCOUNTER — HOSPITAL ENCOUNTER (OUTPATIENT)
Dept: LAB | Facility: HOSPITAL | Age: 82
Discharge: HOME | End: 2022-06-15
Attending: INTERNAL MEDICINE
Payer: MEDICARE

## 2022-06-15 DIAGNOSIS — I48.19 PERSISTENT ATRIAL FIBRILLATION (HCC): ICD-10-CM

## 2022-06-15 LAB — INR BLDC: 3.3 (ref 0.9–1.1)

## 2022-06-15 PROCEDURE — 85610 PROTHROMBIN TIME: CPT

## 2022-06-22 ENCOUNTER — HOSPITAL ENCOUNTER (OUTPATIENT)
Dept: LAB | Facility: HOSPITAL | Age: 82
Discharge: HOME | End: 2022-06-22
Attending: INTERNAL MEDICINE
Payer: MEDICARE

## 2022-06-22 DIAGNOSIS — I48.19 PERSISTENT ATRIAL FIBRILLATION (HCC): ICD-10-CM

## 2022-06-22 LAB — INR BLDC: 3.7 (ref 0.9–1.1)

## 2022-06-22 PROCEDURE — 85610 PROTHROMBIN TIME: CPT

## 2022-06-29 ENCOUNTER — HOSPITAL ENCOUNTER (OUTPATIENT)
Dept: LAB | Facility: HOSPITAL | Age: 82
Discharge: HOME | End: 2022-06-29
Attending: INTERNAL MEDICINE
Payer: MEDICARE

## 2022-06-29 DIAGNOSIS — I48.19 PERSISTENT ATRIAL FIBRILLATION (HCC): ICD-10-CM

## 2022-06-29 LAB — INR BLDC: 3.1 (ref 0.9–1.1)

## 2022-06-29 PROCEDURE — 85610 PROTHROMBIN TIME: CPT

## 2022-07-06 ENCOUNTER — HOSPITAL ENCOUNTER (OUTPATIENT)
Dept: LAB | Facility: HOSPITAL | Age: 82
Discharge: HOME | End: 2022-07-06
Attending: INTERNAL MEDICINE
Payer: MEDICARE

## 2022-07-06 DIAGNOSIS — I48.19 PERSISTENT ATRIAL FIBRILLATION (HCC): ICD-10-CM

## 2022-07-06 LAB — INR BLDC: 2.9 (ref 0.9–1.1)

## 2022-07-06 PROCEDURE — 85610 PROTHROMBIN TIME: CPT

## 2022-07-08 ENCOUNTER — ORDER TRANSCRIPTION (OUTPATIENT)
Dept: ADMINISTRATIVE | Facility: HOSPITAL | Age: 82
End: 2022-07-08

## 2022-07-08 DIAGNOSIS — Z20.822 ENCOUNTER FOR PREPROCEDURE SCREENING LABORATORY TESTING FOR COVID-19: Primary | ICD-10-CM

## 2022-07-08 DIAGNOSIS — Z01.812 ENCOUNTER FOR PREPROCEDURE SCREENING LABORATORY TESTING FOR COVID-19: Primary | ICD-10-CM

## 2022-07-10 ENCOUNTER — LAB ENCOUNTER (OUTPATIENT)
Dept: LAB | Facility: HOSPITAL | Age: 82
End: 2022-07-10
Attending: INTERNAL MEDICINE
Payer: MEDICARE

## 2022-07-10 DIAGNOSIS — Z20.822 ENCOUNTER FOR PREPROCEDURE SCREENING LABORATORY TESTING FOR COVID-19: ICD-10-CM

## 2022-07-10 DIAGNOSIS — Z01.812 ENCOUNTER FOR PREPROCEDURE SCREENING LABORATORY TESTING FOR COVID-19: ICD-10-CM

## 2022-07-11 LAB — SARS-COV-2 RNA RESP QL NAA+PROBE: NOT DETECTED

## 2022-07-13 ENCOUNTER — HOSPITAL ENCOUNTER (OUTPATIENT)
Dept: INTERVENTIONAL RADIOLOGY/VASCULAR | Facility: HOSPITAL | Age: 82
Discharge: HOME | End: 2022-07-13
Attending: INTERNAL MEDICINE | Admitting: INTERNAL MEDICINE
Payer: MEDICARE

## 2022-07-13 ENCOUNTER — HOSPITAL ENCOUNTER (OUTPATIENT)
Dept: CV DIAGNOSTICS | Facility: HOSPITAL | Age: 82
Discharge: HOME | End: 2022-07-13
Attending: INTERNAL MEDICINE
Payer: MEDICARE

## 2022-07-13 VITALS
BODY MASS INDEX: 25 KG/M2 | RESPIRATION RATE: 19 BRPM | TEMPERATURE: 98 F | SYSTOLIC BLOOD PRESSURE: 113 MMHG | WEIGHT: 149 LBS | OXYGEN SATURATION: 97 % | DIASTOLIC BLOOD PRESSURE: 58 MMHG | HEART RATE: 45 BPM

## 2022-07-13 DIAGNOSIS — I48.91 A-FIB (HCC): ICD-10-CM

## 2022-07-13 LAB
ATRIAL RATE: 53 BPM
INR: 2.3 (ref 0.8–1.3)
P AXIS: 72 DEGREES
P-R INTERVAL: 224 MS
Q-T INTERVAL: 428 MS
QRS DURATION: 94 MS
QTC CALCULATION (BEZET): 401 MS
R AXIS: -32 DEGREES
T AXIS: -79 DEGREES
VENTRICULAR RATE: 53 BPM

## 2022-07-13 PROCEDURE — 99152 MOD SED SAME PHYS/QHP 5/>YRS: CPT | Performed by: INTERNAL MEDICINE

## 2022-07-13 PROCEDURE — B24BZZ4 ULTRASONOGRAPHY OF HEART WITH AORTA, TRANSESOPHAGEAL: ICD-10-PCS | Performed by: INTERNAL MEDICINE

## 2022-07-13 PROCEDURE — 85610 PROTHROMBIN TIME: CPT | Performed by: INTERNAL MEDICINE

## 2022-07-13 PROCEDURE — 93320 DOPPLER ECHO COMPLETE: CPT | Performed by: INTERNAL MEDICINE

## 2022-07-13 PROCEDURE — 93010 ELECTROCARDIOGRAM REPORT: CPT | Performed by: INTERNAL MEDICINE

## 2022-07-13 PROCEDURE — 92960 CARDIOVERSION ELECTRIC EXT: CPT | Performed by: INTERNAL MEDICINE

## 2022-07-13 PROCEDURE — 5A2204Z RESTORATION OF CARDIAC RHYTHM, SINGLE: ICD-10-PCS | Performed by: INTERNAL MEDICINE

## 2022-07-13 PROCEDURE — 93325 DOPPLER ECHO COLOR FLOW MAPG: CPT | Performed by: INTERNAL MEDICINE

## 2022-07-13 PROCEDURE — 93312 ECHO TRANSESOPHAGEAL: CPT | Performed by: INTERNAL MEDICINE

## 2022-07-13 PROCEDURE — 93005 ELECTROCARDIOGRAM TRACING: CPT

## 2022-07-13 RX ORDER — MIDAZOLAM HYDROCHLORIDE 1 MG/ML
INJECTION INTRAMUSCULAR; INTRAVENOUS
Status: COMPLETED
Start: 2022-07-13 | End: 2022-07-13

## 2022-07-13 RX ADMIN — MIDAZOLAM HYDROCHLORIDE 2 MG: 1 INJECTION INTRAMUSCULAR; INTRAVENOUS at 12:10:00

## 2022-07-13 RX ADMIN — Medication 20 MG: at 12:30:00

## 2022-07-13 NOTE — PROGRESS NOTES
Pt s/p VANDANA and synchronized cardioversion. Post procedure EKG done confirming NSR, VSS, neurologically intact, tolerated PO intake and ambulation in room. IV d/c'd. Discharge instructions given and pt verbalized understanding. Pt to lobby via [facility] and  to drive home.

## 2022-07-13 NOTE — PROCEDURES
Electrophysiology Procedure Note    [facility] Location: Cath Lab   CSN [id number] MRN [id number]   Admission Date 7/13/2022  Operation Date 07/13/22    Attending Physician Benita Byrd MD Operating Physician Benita Byrd MD     Pre-Operative Diagnosis: Atrial Fibrillation    Post-Operative Diagnosis: Same as above  PROCEDURE(S) PERFORMED:    1. Cardioversion. 2.     Sedation      :  Benita Byrd MD     ANESTHESIA:  IV sedation. Moderate conscious sedation for this procedure was administered and personally monitored. Brevital 20 mg  Sedation time: 10 minutes. Pt was previously sedated for the VANDANA, see Dr. Dilia Oropeza note for those amounts     INDICATION:  Persistent Atrial Fibrillation   VANDANA showed only mild smoke, no ISRAEL thrombus    COMPLICATIONS:  None. METHODS:  The patient was brought to the outpatient cardiac telemetry unit in a fasting and nonsedated state after providing informed consent. IV sedation was administered during continuous ECG, pulse oximeter and noninvasive hemodynamic monitoring. After administering IV Brevital in intermittent boluses, the desired level of sedation was achieved. Cardioversion Energy:  200J    Pad Position: Anterior-Posterior  Pre- Cardioversion Rhythm- Atrial Fibrillation  Post Cardioversion Rhythm - NSR    CONCLUSIONS:  1.     Successful cardioversion       Plan:  1- Rhythm/Rate Control Meds: Continue current meds  2) Anticoagulation- Warfarin  3) Follow Up- 1 month                    Benita Byrd MD     Cardiac Electrophysiololgy  [address] [place] [address]  7/14/2021

## 2022-08-02 ENCOUNTER — HOSPITAL ENCOUNTER (OUTPATIENT)
Dept: LAB | Facility: HOSPITAL | Age: 82
Discharge: HOME | End: 2022-08-02
Attending: INTERNAL MEDICINE
Payer: MEDICARE

## 2022-08-02 DIAGNOSIS — I48.19 PERSISTENT ATRIAL FIBRILLATION (HCC): ICD-10-CM

## 2022-08-02 LAB — INR BLDC: 2.4 (ref 0.9–1.1)

## 2022-08-02 PROCEDURE — 85610 PROTHROMBIN TIME: CPT

## 2022-08-09 ENCOUNTER — HOSPITAL ENCOUNTER (OUTPATIENT)
Dept: LAB | Facility: HOSPITAL | Age: 82
Discharge: HOME | End: 2022-08-09
Attending: INTERNAL MEDICINE
Payer: MEDICARE

## 2022-08-09 DIAGNOSIS — I48.19 PERSISTENT ATRIAL FIBRILLATION (HCC): ICD-10-CM

## 2022-08-09 LAB — INR BLDC: 2.9 (ref 0.9–1.1)

## 2022-08-09 PROCEDURE — 85610 PROTHROMBIN TIME: CPT

## 2022-08-15 ENCOUNTER — HOSPITAL ENCOUNTER (OUTPATIENT)
Dept: LAB | Facility: HOSPITAL | Age: 82
Discharge: HOME | End: 2022-08-15
Attending: INTERNAL MEDICINE
Payer: MEDICARE

## 2022-08-15 DIAGNOSIS — I48.19 PERSISTENT ATRIAL FIBRILLATION (HCC): ICD-10-CM

## 2022-08-15 LAB — INR BLDC: 3 (ref 0.9–1.1)

## 2022-08-15 PROCEDURE — 85610 PROTHROMBIN TIME: CPT

## 2022-08-23 ENCOUNTER — HOSPITAL ENCOUNTER (OUTPATIENT)
Dept: LAB | Facility: HOSPITAL | Age: 82
Discharge: HOME | End: 2022-08-23
Attending: INTERNAL MEDICINE
Payer: MEDICARE

## 2022-08-23 DIAGNOSIS — I48.19 PERSISTENT ATRIAL FIBRILLATION (HCC): ICD-10-CM

## 2022-08-23 LAB — INR BLDC: 3.6 (ref 0.9–1.1)

## 2022-08-23 PROCEDURE — 85610 PROTHROMBIN TIME: CPT

## 2022-08-30 ENCOUNTER — HOSPITAL ENCOUNTER (OUTPATIENT)
Dept: LAB | Facility: HOSPITAL | Age: 82
Discharge: HOME | End: 2022-08-30
Attending: INTERNAL MEDICINE
Payer: MEDICARE

## 2022-08-30 DIAGNOSIS — I48.19 PERSISTENT ATRIAL FIBRILLATION (HCC): ICD-10-CM

## 2022-08-30 LAB — INR BLDC: 2.7 (ref 0.9–1.1)

## 2022-08-30 PROCEDURE — 85610 PROTHROMBIN TIME: CPT

## 2022-09-07 ENCOUNTER — HOSPITAL ENCOUNTER (OUTPATIENT)
Dept: LAB | Facility: HOSPITAL | Age: 82
Discharge: HOME | End: 2022-09-07
Attending: INTERNAL MEDICINE
Payer: MEDICARE

## 2022-09-07 DIAGNOSIS — I48.19 PERSISTENT ATRIAL FIBRILLATION (HCC): ICD-10-CM

## 2022-09-07 LAB — INR BLDC: 2.9 (ref 0.9–1.1)

## 2022-09-07 PROCEDURE — 85610 PROTHROMBIN TIME: CPT

## 2022-09-09 ENCOUNTER — ORDER TRANSCRIPTION (OUTPATIENT)
Dept: ADMINISTRATIVE | Facility: HOSPITAL | Age: 82
End: 2022-09-09

## 2022-09-09 DIAGNOSIS — Z01.818 PRE-OP TESTING: ICD-10-CM

## 2022-09-09 DIAGNOSIS — Z11.59 ENCOUNTER FOR SCREENING FOR OTHER VIRAL DISEASES: Primary | ICD-10-CM

## 2022-09-14 ENCOUNTER — HOSPITAL ENCOUNTER (OUTPATIENT)
Dept: LAB | Facility: HOSPITAL | Age: 82
Discharge: HOME | End: 2022-09-14
Attending: INTERNAL MEDICINE
Payer: MEDICARE

## 2022-09-14 DIAGNOSIS — I48.19 PERSISTENT ATRIAL FIBRILLATION (HCC): ICD-10-CM

## 2022-09-14 LAB — INR BLDC: 3.4 (ref 0.9–1.1)

## 2022-09-14 PROCEDURE — 85610 PROTHROMBIN TIME: CPT

## 2022-09-16 RX ORDER — AMIODARONE HYDROCHLORIDE 200 MG/1
200 TABLET ORAL DAILY
COMMUNITY

## 2022-09-18 ENCOUNTER — LAB ENCOUNTER (OUTPATIENT)
Dept: LAB | Facility: HOSPITAL | Age: 82
End: 2022-09-18
Attending: INTERNAL MEDICINE

## 2022-09-18 DIAGNOSIS — Z11.59 ENCOUNTER FOR SCREENING FOR OTHER VIRAL DISEASES: ICD-10-CM

## 2022-09-18 DIAGNOSIS — Z01.818 PRE-OP TESTING: ICD-10-CM

## 2022-09-19 LAB — SARS-COV-2 RNA RESP QL NAA+PROBE: NOT DETECTED

## 2022-09-20 ENCOUNTER — HOSPITAL ENCOUNTER (OUTPATIENT)
Dept: LAB | Facility: HOSPITAL | Age: 82
Discharge: HOME | End: 2022-09-20
Attending: INTERNAL MEDICINE

## 2022-09-20 DIAGNOSIS — I48.19 PERSISTENT ATRIAL FIBRILLATION (HCC): ICD-10-CM

## 2022-09-20 LAB — INR BLDC: 2.6 (ref 0.9–1.1)

## 2022-09-20 PROCEDURE — 85610 PROTHROMBIN TIME: CPT

## 2022-09-21 ENCOUNTER — HOSPITAL ENCOUNTER (OUTPATIENT)
Dept: INTERVENTIONAL RADIOLOGY/VASCULAR | Facility: HOSPITAL | Age: 82
Discharge: HOME | End: 2022-09-21
Attending: INTERNAL MEDICINE | Admitting: INTERNAL MEDICINE

## 2022-09-21 VITALS
RESPIRATION RATE: 12 BRPM | HEART RATE: 35 BPM | HEIGHT: 55 IN | SYSTOLIC BLOOD PRESSURE: 135 MMHG | OXYGEN SATURATION: 98 % | DIASTOLIC BLOOD PRESSURE: 61 MMHG | BODY MASS INDEX: 34.71 KG/M2 | TEMPERATURE: 98 F | WEIGHT: 150 LBS

## 2022-09-21 DIAGNOSIS — I48.91 A-FIB (HCC): ICD-10-CM

## 2022-09-21 LAB
ATRIAL RATE: 40 BPM
INR: 2.3 (ref 0.8–1.3)
P AXIS: 81 DEGREES
P-R INTERVAL: 264 MS
Q-T INTERVAL: 496 MS
QRS DURATION: 98 MS
QTC CALCULATION (BEZET): 404 MS
R AXIS: -37 DEGREES
T AXIS: 17 DEGREES
VENTRICULAR RATE: 40 BPM

## 2022-09-21 PROCEDURE — 85610 PROTHROMBIN TIME: CPT | Performed by: INTERNAL MEDICINE

## 2022-09-21 PROCEDURE — 93010 ELECTROCARDIOGRAM REPORT: CPT | Performed by: INTERNAL MEDICINE

## 2022-09-21 PROCEDURE — 93005 ELECTROCARDIOGRAM TRACING: CPT

## 2022-09-21 RX ORDER — SODIUM CHLORIDE 9 MG/ML
INJECTION, SOLUTION INTRAVENOUS CONTINUOUS
Status: DISCONTINUED | OUTPATIENT
Start: 2022-09-21 | End: 2022-09-21

## 2022-09-21 NOTE — PROCEDURES
Patient presented in sinus bradycardia.   No procedures were performed    ____________________________  Kenney MD Chano, [facility]  Cardiac Electrophysiololgy  [address] [facility]  9/21/2022

## 2022-09-21 NOTE — PROGRESS NOTES
Pt admitted for VANDANA/CV-SB per monitor, EKG done to confirm-sent picture of EKG to Dr Isidra Reyes. MD spoke to pt and  at bedside. Instructed to stop digoxin. IV d/c'd. Pt to lobby via CRISTO Pope with .

## 2022-10-05 ENCOUNTER — HOSPITAL ENCOUNTER (OUTPATIENT)
Dept: LAB | Facility: HOSPITAL | Age: 82
Discharge: HOME | End: 2022-10-05
Attending: INTERNAL MEDICINE
Payer: MEDICARE

## 2022-10-05 DIAGNOSIS — I48.19 PERSISTENT ATRIAL FIBRILLATION (HCC): ICD-10-CM

## 2022-10-05 LAB — INR BLDC: 3 (ref 0.9–1.1)

## 2022-10-05 PROCEDURE — 85610 PROTHROMBIN TIME: CPT

## 2022-10-10 ENCOUNTER — HOSPITAL ENCOUNTER (OUTPATIENT)
Dept: LAB | Facility: HOSPITAL | Age: 82
Discharge: HOME | End: 2022-10-10
Attending: INTERNAL MEDICINE
Payer: MEDICARE

## 2022-10-10 DIAGNOSIS — I48.19 PERSISTENT ATRIAL FIBRILLATION (HCC): ICD-10-CM

## 2022-10-10 LAB — INR BLDC: 3 (ref 0.9–1.1)

## 2022-10-10 PROCEDURE — 85610 PROTHROMBIN TIME: CPT

## 2022-10-20 ENCOUNTER — HOSPITAL ENCOUNTER (OUTPATIENT)
Dept: LAB | Facility: HOSPITAL | Age: 82
Discharge: HOME | End: 2022-10-20
Attending: INTERNAL MEDICINE
Payer: MEDICARE

## 2022-10-20 DIAGNOSIS — I48.19 PERSISTENT ATRIAL FIBRILLATION (HCC): ICD-10-CM

## 2022-10-20 LAB — INR BLDC: 3.4 (ref 0.9–1.1)

## 2022-10-20 PROCEDURE — 85610 PROTHROMBIN TIME: CPT

## 2022-10-25 ENCOUNTER — HOSPITAL ENCOUNTER (OUTPATIENT)
Dept: LAB | Facility: HOSPITAL | Age: 82
Discharge: HOME | End: 2022-10-25
Attending: INTERNAL MEDICINE
Payer: MEDICARE

## 2022-10-25 DIAGNOSIS — I48.19 PERSISTENT ATRIAL FIBRILLATION (HCC): ICD-10-CM

## 2022-10-25 LAB — INR BLDC: 2.4 (ref 0.9–1.1)

## 2022-10-25 PROCEDURE — 85610 PROTHROMBIN TIME: CPT

## 2022-10-28 ENCOUNTER — HOSPITAL ENCOUNTER (OUTPATIENT)
Dept: LAB | Facility: HOSPITAL | Age: 82
Discharge: HOME | End: 2022-10-28
Attending: INTERNAL MEDICINE
Payer: MEDICARE

## 2022-10-28 DIAGNOSIS — I48.19 PERSISTENT ATRIAL FIBRILLATION (HCC): ICD-10-CM

## 2022-10-28 LAB — INR BLDC: 2.5 (ref 0.9–1.1)

## 2022-10-28 PROCEDURE — 85610 PROTHROMBIN TIME: CPT

## 2022-11-01 ENCOUNTER — HOSPITAL ENCOUNTER (OUTPATIENT)
Dept: LAB | Facility: HOSPITAL | Age: 82
Discharge: HOME | End: 2022-11-01
Attending: INTERNAL MEDICINE
Payer: MEDICARE

## 2022-11-01 ENCOUNTER — LAB ENCOUNTER (OUTPATIENT)
Dept: LAB | Facility: HOSPITAL | Age: 82
End: 2022-11-01
Attending: NURSE PRACTITIONER
Payer: MEDICARE

## 2022-11-01 DIAGNOSIS — I48.19 PERSISTENT ATRIAL FIBRILLATION (HCC): ICD-10-CM

## 2022-11-01 DIAGNOSIS — I10 ESSENTIAL HYPERTENSION, MALIGNANT: Primary | ICD-10-CM

## 2022-11-01 DIAGNOSIS — E78.2 MIXED HYPERLIPIDEMIA: ICD-10-CM

## 2022-11-01 LAB
ALBUMIN SERPL-MCNC: 3.5 G/DL (ref 3.4–5)
ALBUMIN/GLOB SERPL: 1.1 {RATIO} (ref 1–2)
ALP LIVER SERPL-CCNC: 61 U/L
ALT SERPL-CCNC: 76 U/L
ANION GAP SERPL CALC-SCNC: 1 MMOL/L (ref 0–18)
AST SERPL-CCNC: 26 U/L (ref 15–37)
BASOPHILS # BLD AUTO: 0.02 X10(3) UL (ref 0–0.2)
BASOPHILS NFR BLD AUTO: 0.2 %
BILIRUB SERPL-MCNC: 0.6 MG/DL (ref 0.1–2)
BUN BLD-MCNC: 31 MG/DL (ref 7–18)
CALCIUM BLD-MCNC: 9.1 MG/DL (ref 8.5–10.1)
CHLORIDE SERPL-SCNC: 105 MMOL/L (ref 98–112)
CHOLEST SERPL-MCNC: 165 MG/DL (ref ?–200)
CO2 SERPL-SCNC: 31 MMOL/L (ref 21–32)
CREAT BLD-MCNC: 1.1 MG/DL
EOSINOPHIL # BLD AUTO: 0.05 X10(3) UL (ref 0–0.7)
EOSINOPHIL NFR BLD AUTO: 0.6 %
ERYTHROCYTE [DISTWIDTH] IN BLOOD BY AUTOMATED COUNT: 14.6 %
FASTING PATIENT LIPID ANSWER: YES
FASTING STATUS PATIENT QL REPORTED: YES
GFR SERPLBLD BASED ON 1.73 SQ M-ARVRAT: 51 ML/MIN/1.73M2 (ref 60–?)
GLOBULIN PLAS-MCNC: 3.2 G/DL (ref 2.8–4.4)
GLUCOSE BLD-MCNC: 100 MG/DL (ref 70–99)
HCT VFR BLD AUTO: 38 %
HDLC SERPL-MCNC: 58 MG/DL (ref 40–59)
HGB BLD-MCNC: 12.5 G/DL
IMM GRANULOCYTES # BLD AUTO: 0.06 X10(3) UL (ref 0–1)
IMM GRANULOCYTES NFR BLD: 0.7 %
INR BLDC: 2.3 (ref 0.9–1.1)
LDLC SERPL CALC-MCNC: 86 MG/DL (ref ?–100)
LYMPHOCYTES # BLD AUTO: 1.54 X10(3) UL (ref 1–4)
LYMPHOCYTES NFR BLD AUTO: 19.2 %
MCH RBC QN AUTO: 28.9 PG (ref 26–34)
MCHC RBC AUTO-ENTMCNC: 32.9 G/DL (ref 31–37)
MCV RBC AUTO: 88 FL
MONOCYTES # BLD AUTO: 0.74 X10(3) UL (ref 0.1–1)
MONOCYTES NFR BLD AUTO: 9.2 %
NEUTROPHILS # BLD AUTO: 5.6 X10 (3) UL (ref 1.5–7.7)
NEUTROPHILS # BLD AUTO: 5.6 X10(3) UL (ref 1.5–7.7)
NEUTROPHILS NFR BLD AUTO: 70.1 %
NONHDLC SERPL-MCNC: 107 MG/DL (ref ?–130)
OSMOLALITY SERPL CALC.SUM OF ELEC: 291 MOSM/KG (ref 275–295)
PLATELET # BLD AUTO: 263 10(3)UL (ref 150–450)
POTASSIUM SERPL-SCNC: 3.8 MMOL/L (ref 3.5–5.1)
PROT SERPL-MCNC: 6.7 G/DL (ref 6.4–8.2)
RBC # BLD AUTO: 4.32 X10(6)UL
SODIUM SERPL-SCNC: 137 MMOL/L (ref 136–145)
TRIGL SERPL-MCNC: 118 MG/DL (ref 30–149)
VLDLC SERPL CALC-MCNC: 19 MG/DL (ref 0–30)
WBC # BLD AUTO: 8 X10(3) UL (ref 4–11)

## 2022-11-01 PROCEDURE — 85025 COMPLETE CBC W/AUTO DIFF WBC: CPT

## 2022-11-01 PROCEDURE — 36415 COLL VENOUS BLD VENIPUNCTURE: CPT

## 2022-11-01 PROCEDURE — 80061 LIPID PANEL: CPT

## 2022-11-01 PROCEDURE — 85610 PROTHROMBIN TIME: CPT

## 2022-11-01 PROCEDURE — 80053 COMPREHEN METABOLIC PANEL: CPT

## 2022-11-04 ENCOUNTER — HOSPITAL ENCOUNTER (OUTPATIENT)
Dept: LAB | Facility: HOSPITAL | Age: 82
Discharge: HOME | End: 2022-11-04
Attending: INTERNAL MEDICINE
Payer: MEDICARE

## 2022-11-04 DIAGNOSIS — I48.19 PERSISTENT ATRIAL FIBRILLATION (HCC): ICD-10-CM

## 2022-11-04 LAB — INR BLDC: 2 (ref 0.9–1.1)

## 2022-11-04 PROCEDURE — 85610 PROTHROMBIN TIME: CPT

## 2022-11-14 ENCOUNTER — HOSPITAL ENCOUNTER (OUTPATIENT)
Dept: LAB | Facility: HOSPITAL | Age: 82
Discharge: HOME | End: 2022-11-14
Attending: INTERNAL MEDICINE
Payer: MEDICARE

## 2022-11-14 DIAGNOSIS — I48.19 PERSISTENT ATRIAL FIBRILLATION (HCC): ICD-10-CM

## 2022-11-14 LAB — INR BLDC: 1.8 (ref 0.9–1.1)

## 2022-11-14 PROCEDURE — 85610 PROTHROMBIN TIME: CPT

## 2022-11-28 ENCOUNTER — HOSPITAL ENCOUNTER (OUTPATIENT)
Dept: LAB | Facility: HOSPITAL | Age: 82
Discharge: HOME | End: 2022-11-28
Attending: INTERNAL MEDICINE
Payer: MEDICARE

## 2022-11-28 DIAGNOSIS — I48.19 PERSISTENT ATRIAL FIBRILLATION (HCC): ICD-10-CM

## 2022-11-28 LAB — INR BLDC: 3 (ref 0.9–1.1)

## 2022-11-28 PROCEDURE — 85610 PROTHROMBIN TIME: CPT

## 2022-12-20 ENCOUNTER — HOSPITAL ENCOUNTER (OUTPATIENT)
Dept: LAB | Facility: HOSPITAL | Age: 82
Discharge: HOME | End: 2022-12-20
Attending: INTERNAL MEDICINE
Payer: MEDICARE

## 2022-12-20 DIAGNOSIS — I48.19 ATRIAL FIBRILLATION, PERSISTENT (HCC): Primary | ICD-10-CM

## 2022-12-20 DIAGNOSIS — I48.19 ATRIAL FIBRILLATION, PERSISTENT (HCC): ICD-10-CM

## 2022-12-20 LAB — INR BLDC: 2.5 (ref 0.9–1.1)

## 2022-12-20 PROCEDURE — 85610 PROTHROMBIN TIME: CPT

## 2022-12-28 ENCOUNTER — HOSPITAL ENCOUNTER (OUTPATIENT)
Dept: LAB | Facility: HOSPITAL | Age: 82
Discharge: HOME | End: 2022-12-28
Attending: INTERNAL MEDICINE
Payer: MEDICARE

## 2022-12-28 DIAGNOSIS — I48.19 ATRIAL FIBRILLATION, PERSISTENT (HCC): ICD-10-CM

## 2022-12-28 LAB — INR BLDC: 2.4 (ref 0.9–1.1)

## 2022-12-28 PROCEDURE — 85610 PROTHROMBIN TIME: CPT

## 2023-01-11 ENCOUNTER — HOSPITAL ENCOUNTER (OUTPATIENT)
Dept: LAB | Facility: HOSPITAL | Age: 83
Discharge: HOME | End: 2023-01-11
Attending: INTERNAL MEDICINE
Payer: MEDICARE

## 2023-01-11 DIAGNOSIS — I48.19 ATRIAL FIBRILLATION, PERSISTENT (HCC): ICD-10-CM

## 2023-01-11 LAB — INR BLDC: 2 (ref 0.9–1.1)

## 2023-01-11 PROCEDURE — 85610 PROTHROMBIN TIME: CPT

## 2023-01-26 ENCOUNTER — HOSPITAL ENCOUNTER (OUTPATIENT)
Dept: LAB | Facility: HOSPITAL | Age: 83
Discharge: HOME | End: 2023-01-26
Attending: INTERNAL MEDICINE
Payer: MEDICARE

## 2023-01-26 DIAGNOSIS — I48.19 ATRIAL FIBRILLATION, PERSISTENT (HCC): ICD-10-CM

## 2023-01-26 LAB — INR BLDC: 1.9 (ref 0.9–1.1)

## 2023-01-26 PROCEDURE — 85610 PROTHROMBIN TIME: CPT

## 2023-02-03 ENCOUNTER — HOSPITAL ENCOUNTER (OUTPATIENT)
Dept: LAB | Facility: HOSPITAL | Age: 83
Discharge: HOME | End: 2023-02-03
Attending: INTERNAL MEDICINE
Payer: MEDICARE

## 2023-02-03 DIAGNOSIS — I48.19 ATRIAL FIBRILLATION, PERSISTENT (HCC): ICD-10-CM

## 2023-02-03 LAB — INR BLDC: 2.1 (ref 0.9–1.1)

## 2023-02-03 PROCEDURE — 85610 PROTHROMBIN TIME: CPT

## 2023-02-07 ENCOUNTER — TELEPHONE (OUTPATIENT)
Dept: CARDIOLOGY CLINIC | Facility: HOSPITAL | Age: 83
End: 2023-02-07

## 2023-02-07 NOTE — TELEPHONE ENCOUNTER
Watchman referral from Dr. Rossi Floyd, calling to discuss next steps. Next procedure date 3/3. Contact info provided.

## 2023-02-09 ENCOUNTER — HOSPITAL ENCOUNTER (OUTPATIENT)
Dept: LAB | Facility: HOSPITAL | Age: 83
Discharge: HOME | End: 2023-02-09
Attending: INTERNAL MEDICINE
Payer: MEDICARE

## 2023-02-09 DIAGNOSIS — I48.19 ATRIAL FIBRILLATION, PERSISTENT (HCC): ICD-10-CM

## 2023-02-09 LAB — INR BLDC: 3.6 (ref 0.9–1.1)

## 2023-02-09 PROCEDURE — 85610 PROTHROMBIN TIME: CPT

## 2023-02-13 ENCOUNTER — TELEPHONE (OUTPATIENT)
Dept: CARDIOLOGY CLINIC | Facility: HOSPITAL | Age: 83
End: 2023-02-13

## 2023-02-13 NOTE — TELEPHONE ENCOUNTER
Explained Watchman process/procedure. Pre Procedure VANDANA complete. Given date of 5/23 (March and April dates offered) for implant. Will f/u closer to procedure date. Contact info provided.

## 2023-02-15 ENCOUNTER — HOSPITAL ENCOUNTER (OUTPATIENT)
Dept: LAB | Facility: HOSPITAL | Age: 83
Discharge: HOME | End: 2023-02-15
Attending: INTERNAL MEDICINE
Payer: MEDICARE

## 2023-02-15 DIAGNOSIS — I48.19 ATRIAL FIBRILLATION, PERSISTENT (HCC): ICD-10-CM

## 2023-02-15 LAB — INR BLDC: 2.6 (ref 0.9–1.1)

## 2023-02-15 PROCEDURE — 85610 PROTHROMBIN TIME: CPT

## 2023-02-24 ENCOUNTER — HOSPITAL ENCOUNTER (OUTPATIENT)
Dept: LAB | Facility: HOSPITAL | Age: 83
Discharge: HOME | End: 2023-02-24
Attending: INTERNAL MEDICINE
Payer: MEDICARE

## 2023-02-24 DIAGNOSIS — I48.19 ATRIAL FIBRILLATION, PERSISTENT (HCC): ICD-10-CM

## 2023-02-24 LAB — INR BLDC: 2.5 (ref 0.9–1.1)

## 2023-02-24 PROCEDURE — 85610 PROTHROMBIN TIME: CPT

## 2023-03-09 ENCOUNTER — HOSPITAL ENCOUNTER (OUTPATIENT)
Dept: LAB | Facility: HOSPITAL | Age: 83
Discharge: HOME | End: 2023-03-09
Attending: INTERNAL MEDICINE
Payer: MEDICARE

## 2023-03-09 DIAGNOSIS — I48.19 ATRIAL FIBRILLATION, PERSISTENT (HCC): ICD-10-CM

## 2023-03-09 LAB — INR BLDC: 2.4 (ref 0.9–1.1)

## 2023-03-09 PROCEDURE — 85610 PROTHROMBIN TIME: CPT

## 2023-03-24 ENCOUNTER — HOSPITAL ENCOUNTER (OUTPATIENT)
Dept: LAB | Facility: HOSPITAL | Age: 83
Discharge: HOME | End: 2023-03-24
Attending: INTERNAL MEDICINE
Payer: MEDICARE

## 2023-03-24 DIAGNOSIS — I48.19 ATRIAL FIBRILLATION, PERSISTENT (HCC): ICD-10-CM

## 2023-03-24 LAB — INR BLDC: 3 (ref 0.9–1.1)

## 2023-03-24 PROCEDURE — 85610 PROTHROMBIN TIME: CPT

## 2023-04-11 ENCOUNTER — TELEPHONE (OUTPATIENT)
Dept: CARDIOLOGY CLINIC | Facility: HOSPITAL | Age: 83
End: 2023-04-11

## 2023-04-11 DIAGNOSIS — I48.91 A-FIB (HCC): Primary | ICD-10-CM

## 2023-04-14 ENCOUNTER — HOSPITAL ENCOUNTER (OUTPATIENT)
Dept: LAB | Facility: HOSPITAL | Age: 83
Discharge: HOME | End: 2023-04-14
Attending: INTERNAL MEDICINE
Payer: MEDICARE

## 2023-04-14 DIAGNOSIS — I48.19 ATRIAL FIBRILLATION, PERSISTENT (HCC): ICD-10-CM

## 2023-04-14 LAB — INR BLDC: 2.4 (ref 0.9–1.1)

## 2023-04-14 PROCEDURE — 85610 PROTHROMBIN TIME: CPT

## 2023-04-17 ENCOUNTER — TELEPHONE (OUTPATIENT)
Dept: CARDIOLOGY CLINIC | Facility: HOSPITAL | Age: 83
End: 2023-04-17

## 2023-05-01 ENCOUNTER — HOSPITAL ENCOUNTER (OUTPATIENT)
Dept: LAB | Facility: HOSPITAL | Age: 83
Discharge: HOME | End: 2023-05-01
Attending: INTERNAL MEDICINE
Payer: MEDICARE

## 2023-05-01 DIAGNOSIS — I48.19 ATRIAL FIBRILLATION, PERSISTENT (HCC): ICD-10-CM

## 2023-05-01 LAB — INR BLDC: 2.5 (ref 0.9–1.1)

## 2023-05-01 PROCEDURE — 85610 PROTHROMBIN TIME: CPT

## 2023-05-15 RX ORDER — GABAPENTIN 100 MG/1
100 CAPSULE ORAL NIGHTLY
COMMUNITY

## 2023-05-18 ENCOUNTER — EKG ENCOUNTER (OUTPATIENT)
Dept: LAB | Facility: HOSPITAL | Age: 83
End: 2023-05-18
Attending: INTERNAL MEDICINE
Payer: MEDICARE

## 2023-05-18 ENCOUNTER — HOSPITAL ENCOUNTER (OUTPATIENT)
Dept: GENERAL RADIOLOGY | Facility: HOSPITAL | Age: 83
Discharge: HOME | End: 2023-05-18
Attending: INTERNAL MEDICINE
Payer: MEDICARE

## 2023-05-18 DIAGNOSIS — I48.91 A-FIB (HCC): ICD-10-CM

## 2023-05-18 LAB
ANION GAP SERPL CALC-SCNC: 3 MMOL/L (ref 0–18)
ANTIBODY SCREEN: NEGATIVE
ATRIAL RATE: 48 BPM
BUN BLD-MCNC: 27 MG/DL (ref 7–18)
CALCIUM BLD-MCNC: 9.2 MG/DL (ref 8.5–10.1)
CHLORIDE SERPL-SCNC: 106 MMOL/L (ref 98–112)
CO2 SERPL-SCNC: 30 MMOL/L (ref 21–32)
CREAT BLD-MCNC: 1.13 MG/DL
ERYTHROCYTE [DISTWIDTH] IN BLOOD BY AUTOMATED COUNT: 13.6 %
FASTING STATUS PATIENT QL REPORTED: YES
GFR SERPLBLD BASED ON 1.73 SQ M-ARVRAT: 49 ML/MIN/1.73M2 (ref 60–?)
GLUCOSE BLD-MCNC: 104 MG/DL (ref 70–99)
HCT VFR BLD AUTO: 37.7 %
HGB BLD-MCNC: 12 G/DL
INR BLD: 2.23 (ref 0.85–1.16)
MCH RBC QN AUTO: 28.9 PG (ref 26–34)
MCHC RBC AUTO-ENTMCNC: 31.8 G/DL (ref 31–37)
MCV RBC AUTO: 90.8 FL
OSMOLALITY SERPL CALC.SUM OF ELEC: 293 MOSM/KG (ref 275–295)
P AXIS: 73 DEGREES
P-R INTERVAL: 200 MS
PLATELET # BLD AUTO: 269 10(3)UL (ref 150–450)
POTASSIUM SERPL-SCNC: 3.8 MMOL/L (ref 3.5–5.1)
PROTHROMBIN TIME: 24.4 SECONDS (ref 11.6–14.8)
Q-T INTERVAL: 478 MS
QRS DURATION: 102 MS
QTC CALCULATION (BEZET): 427 MS
R AXIS: -31 DEGREES
RBC # BLD AUTO: 4.15 X10(6)UL
RH BLOOD TYPE: POSITIVE
SODIUM SERPL-SCNC: 139 MMOL/L (ref 136–145)
T AXIS: 23 DEGREES
VENTRICULAR RATE: 48 BPM
WBC # BLD AUTO: 7 X10(3) UL (ref 4–11)

## 2023-05-18 PROCEDURE — 71045 X-RAY EXAM CHEST 1 VIEW: CPT | Performed by: INTERNAL MEDICINE

## 2023-05-18 PROCEDURE — 36415 COLL VENOUS BLD VENIPUNCTURE: CPT

## 2023-05-18 PROCEDURE — 85027 COMPLETE CBC AUTOMATED: CPT

## 2023-05-18 PROCEDURE — 93005 ELECTROCARDIOGRAM TRACING: CPT

## 2023-05-18 PROCEDURE — 86850 RBC ANTIBODY SCREEN: CPT

## 2023-05-18 PROCEDURE — 80048 BASIC METABOLIC PNL TOTAL CA: CPT

## 2023-05-18 PROCEDURE — 86901 BLOOD TYPING SEROLOGIC RH(D): CPT

## 2023-05-18 PROCEDURE — 85610 PROTHROMBIN TIME: CPT

## 2023-05-18 PROCEDURE — 86900 BLOOD TYPING SEROLOGIC ABO: CPT

## 2023-05-18 PROCEDURE — 93010 ELECTROCARDIOGRAM REPORT: CPT | Performed by: INTERNAL MEDICINE

## 2023-05-22 ENCOUNTER — TELEPHONE (OUTPATIENT)
Dept: CARDIOLOGY CLINIC | Facility: HOSPITAL | Age: 83
End: 2023-05-22

## 2023-05-22 NOTE — DISCHARGE INSTRUCTIONS
FOLLOWING LEFT ATRIAL APPENDAGE CLOSURE DEVICE (LAAO)  These discharge instructions are provided for you as a guide until you are seen for a follow-up  appointment. You may shower the day after the procedure. Remove band aide if still applied to groin  site. No bath, hot tubs, or swimming for 72 hours. Your groin will generally have one access point. Some minor bruising is common at each  site with minor soreness. If larger swelling or more significant pain occurs at the area,  please contact the doctor's office. In the first few days after the procedure, you should take it easy. No heavy lifting (no  more than 10 pounds) or heavy exertion. You will begin taking Plavix 75mg and Aspirin 81 mg. Please follow your doctor's instructions for this medication. Your follow up appointment has been scheduled. Please refer to your hospital discharge  instructions. Call the physician's office if you need to reschedule . You will need to have a follow up VANDANA  2-5 months post implant. We will contact you with a date for this procedure. You may resume your present diet, unless otherwise specified by your physician. You may resume all of your medications as prescribed, unless otherwise directed by  your physician. A list of your medications will be provided to you at discharge. Other:    You should call the doctor if you experience any of the following: If you have chest pain (angina). If you have persistent pain at the procedure site. If you experience signs of a fever, temperature >101, chills, infection (redness, swelling,  thick yellow drainage, or a foul odor from the procedure site).    General feeling of being unwell    You should go to the Emergency Room at the hospital if you experience any of the  following:   Increasing chest pain   Rapid heartbeats   Shortness of breath   High fever   Any symptom such as numbness, tingling, dizziness or double vision    You may have an MRI is needed. Let your physician and the radiology technician know that you have a Left Atrial Appendage closure device. You will need to take a prophylactic antibiotic before any dental procedures. Let your dentist know that you have a Left Atrial Appendage Closure device. Your dentist will prescribe the antibiotic for you. Please carry your LAAO card with you. IF YOU HAVE ANY QUESTIONS OR CONCERNS FROM NOW UNTIL 8:00AM TOMORROW MORNING CALL [phone]. THIS PHONE WILL BE ANSWERED BY A NURSE FROM THE CATH LAB.

## 2023-05-23 ENCOUNTER — HOSPITAL ENCOUNTER (INPATIENT)
Dept: CV DIAGNOSTICS | Facility: HOSPITAL | Age: 83
Discharge: HOME | DRG: 274 | End: 2023-05-23
Attending: INTERNAL MEDICINE
Payer: MEDICARE

## 2023-05-23 ENCOUNTER — HOSPITAL ENCOUNTER (INPATIENT)
Dept: CV DIAGNOSTICS | Facility: HOSPITAL | Age: 83
Discharge: HOME | End: 2023-05-23
Attending: INTERNAL MEDICINE
Payer: MEDICARE

## 2023-05-23 ENCOUNTER — ANESTHESIA EVENT (OUTPATIENT)
Dept: INTERVENTIONAL RADIOLOGY/VASCULAR | Facility: HOSPITAL | Age: 83
End: 2023-05-23
Payer: MEDICARE

## 2023-05-23 ENCOUNTER — HOSPITAL ENCOUNTER (INPATIENT)
Dept: INTERVENTIONAL RADIOLOGY/VASCULAR | Facility: HOSPITAL | Age: 83
LOS: 1 days | Discharge: HOME | End: 2023-05-23
Attending: INTERNAL MEDICINE | Admitting: INTERNAL MEDICINE
Payer: MEDICARE

## 2023-05-23 ENCOUNTER — HOSPITAL ENCOUNTER (INPATIENT)
Dept: INTERVENTIONAL RADIOLOGY/VASCULAR | Facility: HOSPITAL | Age: 83
LOS: 1 days | Discharge: HOME | DRG: 274 | End: 2023-05-23
Attending: INTERNAL MEDICINE | Admitting: INTERNAL MEDICINE
Payer: MEDICARE

## 2023-05-23 VITALS
SYSTOLIC BLOOD PRESSURE: 126 MMHG | HEART RATE: 55 BPM | HEIGHT: 65 IN | BODY MASS INDEX: 25.34 KG/M2 | OXYGEN SATURATION: 99 % | WEIGHT: 152.13 LBS | RESPIRATION RATE: 14 BRPM | TEMPERATURE: 98 F | DIASTOLIC BLOOD PRESSURE: 83 MMHG

## 2023-05-23 DIAGNOSIS — I48.91 ATRIAL FIBRILLATION, UNSPECIFIED TYPE (HCC): ICD-10-CM

## 2023-05-23 LAB
INR: 1.4 (ref 0.8–1.3)
ISTAT ACTIVATED CLOTTING TIME: 263 SECONDS (ref 74–137)

## 2023-05-23 PROCEDURE — 02L73DK OCCLUSION OF LEFT ATRIAL APPENDAGE WITH INTRALUMINAL DEVICE, PERCUTANEOUS APPROACH: ICD-10-PCS | Performed by: INTERNAL MEDICINE

## 2023-05-23 PROCEDURE — 76942 ECHO GUIDE FOR BIOPSY: CPT | Performed by: ANESTHESIOLOGY

## 2023-05-23 PROCEDURE — 85347 COAGULATION TIME ACTIVATED: CPT

## 2023-05-23 PROCEDURE — 93355 ECHO TRANSESOPHAGEAL (TEE): CPT | Performed by: INTERNAL MEDICINE

## 2023-05-23 PROCEDURE — B246ZZ4 ULTRASONOGRAPHY OF RIGHT AND LEFT HEART, TRANSESOPHAGEAL: ICD-10-PCS | Performed by: INTERNAL MEDICINE

## 2023-05-23 PROCEDURE — 33340 PERQ CLSR TCAT L ATR APNDGE: CPT | Performed by: INTERNAL MEDICINE

## 2023-05-23 PROCEDURE — 86920 COMPATIBILITY TEST SPIN: CPT

## 2023-05-23 RX ORDER — DEXAMETHASONE SODIUM PHOSPHATE 4 MG/ML
VIAL (ML) INJECTION AS NEEDED
Status: DISCONTINUED | OUTPATIENT
Start: 2023-05-23 | End: 2023-05-23 | Stop reason: SURG

## 2023-05-23 RX ORDER — HYDROMORPHONE HYDROCHLORIDE 1 MG/ML
0.4 INJECTION, SOLUTION INTRAMUSCULAR; INTRAVENOUS; SUBCUTANEOUS EVERY 5 MIN PRN
Status: DISCONTINUED | OUTPATIENT
Start: 2023-05-23 | End: 2023-05-23 | Stop reason: HOSPADM

## 2023-05-23 RX ORDER — ACETAMINOPHEN 500 MG
1000 TABLET ORAL ONCE AS NEEDED
Status: DISCONTINUED | OUTPATIENT
Start: 2023-05-23 | End: 2023-05-23 | Stop reason: HOSPADM

## 2023-05-23 RX ORDER — CLOPIDOGREL BISULFATE 75 MG/1
75 TABLET ORAL DAILY
Status: DISCONTINUED | OUTPATIENT
Start: 2023-05-24 | End: 2023-05-23

## 2023-05-23 RX ORDER — NALOXONE HYDROCHLORIDE 0.4 MG/ML
80 INJECTION, SOLUTION INTRAMUSCULAR; INTRAVENOUS; SUBCUTANEOUS AS NEEDED
Status: DISCONTINUED | OUTPATIENT
Start: 2023-05-23 | End: 2023-05-23 | Stop reason: HOSPADM

## 2023-05-23 RX ORDER — METOPROLOL TARTRATE 5 MG/5ML
2.5 INJECTION INTRAVENOUS ONCE
Status: DISCONTINUED | OUTPATIENT
Start: 2023-05-23 | End: 2023-05-23 | Stop reason: HOSPADM

## 2023-05-23 RX ORDER — HEPARIN SODIUM 5000 [USP'U]/ML
INJECTION, SOLUTION INTRAVENOUS; SUBCUTANEOUS
Status: COMPLETED
Start: 2023-05-23 | End: 2023-05-23

## 2023-05-23 RX ORDER — ASPIRIN 81 MG/1
81 TABLET ORAL DAILY
Status: DISCONTINUED | OUTPATIENT
Start: 2023-05-24 | End: 2023-05-23

## 2023-05-23 RX ORDER — MIDAZOLAM HYDROCHLORIDE 1 MG/ML
1 INJECTION INTRAMUSCULAR; INTRAVENOUS EVERY 5 MIN PRN
Status: DISCONTINUED | OUTPATIENT
Start: 2023-05-23 | End: 2023-05-23 | Stop reason: HOSPADM

## 2023-05-23 RX ORDER — HEPARIN SODIUM 1000 [USP'U]/ML
INJECTION, SOLUTION INTRAVENOUS; SUBCUTANEOUS AS NEEDED
Status: DISCONTINUED | OUTPATIENT
Start: 2023-05-23 | End: 2023-05-23 | Stop reason: SURG

## 2023-05-23 RX ORDER — LIDOCAINE HYDROCHLORIDE 10 MG/ML
INJECTION, SOLUTION EPIDURAL; INFILTRATION; INTRACAUDAL; PERINEURAL
Status: COMPLETED
Start: 2023-05-23 | End: 2023-05-23

## 2023-05-23 RX ORDER — PROTAMINE SULFATE 10 MG/ML
INJECTION, SOLUTION INTRAVENOUS
Status: COMPLETED
Start: 2023-05-23 | End: 2023-05-23

## 2023-05-23 RX ORDER — DIPHENHYDRAMINE HYDROCHLORIDE 50 MG/ML
12.5 INJECTION INTRAMUSCULAR; INTRAVENOUS AS NEEDED
Status: DISCONTINUED | OUTPATIENT
Start: 2023-05-23 | End: 2023-05-23 | Stop reason: HOSPADM

## 2023-05-23 RX ORDER — HYDROCODONE BITARTRATE AND ACETAMINOPHEN 5; 325 MG/1; MG/1
2 TABLET ORAL ONCE AS NEEDED
Status: DISCONTINUED | OUTPATIENT
Start: 2023-05-23 | End: 2023-05-23 | Stop reason: HOSPADM

## 2023-05-23 RX ORDER — MEPERIDINE HYDROCHLORIDE 25 MG/ML
12.5 INJECTION INTRAMUSCULAR; INTRAVENOUS; SUBCUTANEOUS AS NEEDED
Status: DISCONTINUED | OUTPATIENT
Start: 2023-05-23 | End: 2023-05-23 | Stop reason: HOSPADM

## 2023-05-23 RX ORDER — HYDROCODONE BITARTRATE AND ACETAMINOPHEN 5; 325 MG/1; MG/1
1 TABLET ORAL ONCE AS NEEDED
Status: DISCONTINUED | OUTPATIENT
Start: 2023-05-23 | End: 2023-05-23 | Stop reason: HOSPADM

## 2023-05-23 RX ORDER — LIDOCAINE HYDROCHLORIDE 10 MG/ML
INJECTION, SOLUTION EPIDURAL; INFILTRATION; INTRACAUDAL; PERINEURAL AS NEEDED
Status: DISCONTINUED | OUTPATIENT
Start: 2023-05-23 | End: 2023-05-23 | Stop reason: SURG

## 2023-05-23 RX ORDER — PROTAMINE SULFATE 10 MG/ML
INJECTION, SOLUTION INTRAVENOUS AS NEEDED
Status: DISCONTINUED | OUTPATIENT
Start: 2023-05-23 | End: 2023-05-23 | Stop reason: SURG

## 2023-05-23 RX ORDER — CLOPIDOGREL BISULFATE 75 MG/1
75 TABLET ORAL DAILY
Qty: 30 TABLET | Refills: 3 | Status: SHIPPED | OUTPATIENT
Start: 2023-05-24

## 2023-05-23 RX ORDER — ROCURONIUM BROMIDE 10 MG/ML
INJECTION, SOLUTION INTRAVENOUS AS NEEDED
Status: DISCONTINUED | OUTPATIENT
Start: 2023-05-23 | End: 2023-05-23 | Stop reason: SURG

## 2023-05-23 RX ORDER — SODIUM CHLORIDE, SODIUM LACTATE, POTASSIUM CHLORIDE, CALCIUM CHLORIDE 600; 310; 30; 20 MG/100ML; MG/100ML; MG/100ML; MG/100ML
INJECTION, SOLUTION INTRAVENOUS CONTINUOUS
Status: DISCONTINUED | OUTPATIENT
Start: 2023-05-23 | End: 2023-05-23 | Stop reason: HOSPADM

## 2023-05-23 RX ORDER — HYDROMORPHONE HYDROCHLORIDE 1 MG/ML
0.6 INJECTION, SOLUTION INTRAMUSCULAR; INTRAVENOUS; SUBCUTANEOUS EVERY 5 MIN PRN
Status: DISCONTINUED | OUTPATIENT
Start: 2023-05-23 | End: 2023-05-23 | Stop reason: HOSPADM

## 2023-05-23 RX ORDER — HYDROMORPHONE HYDROCHLORIDE 1 MG/ML
0.2 INJECTION, SOLUTION INTRAMUSCULAR; INTRAVENOUS; SUBCUTANEOUS EVERY 5 MIN PRN
Status: DISCONTINUED | OUTPATIENT
Start: 2023-05-23 | End: 2023-05-23 | Stop reason: HOSPADM

## 2023-05-23 RX ORDER — SODIUM CHLORIDE 9 MG/ML
INJECTION, SOLUTION INTRAVENOUS CONTINUOUS
Status: DISCONTINUED | OUTPATIENT
Start: 2023-05-23 | End: 2023-05-23

## 2023-05-23 RX ORDER — IODIXANOL 320 MG/ML
100 INJECTION, SOLUTION INTRAVASCULAR
Status: COMPLETED | OUTPATIENT
Start: 2023-05-23 | End: 2023-05-23

## 2023-05-23 RX ORDER — VANCOMYCIN HYDROCHLORIDE 1 G/20ML
INJECTION, POWDER, LYOPHILIZED, FOR SOLUTION INTRAVENOUS
Status: COMPLETED
Start: 2023-05-23 | End: 2023-05-23

## 2023-05-23 RX ORDER — ONDANSETRON 2 MG/ML
INJECTION INTRAMUSCULAR; INTRAVENOUS AS NEEDED
Status: DISCONTINUED | OUTPATIENT
Start: 2023-05-23 | End: 2023-05-23 | Stop reason: SURG

## 2023-05-23 RX ADMIN — HEPARIN SODIUM 5000 UNITS: 1000 INJECTION, SOLUTION INTRAVENOUS; SUBCUTANEOUS at 15:38:00

## 2023-05-23 RX ADMIN — SODIUM CHLORIDE: 9 INJECTION, SOLUTION INTRAVENOUS at 16:30:00

## 2023-05-23 RX ADMIN — PROTAMINE SULFATE 50 MG: 10 INJECTION, SOLUTION INTRAVENOUS at 16:08:00

## 2023-05-23 RX ADMIN — HEPARIN SODIUM 5000 UNITS: 1000 INJECTION, SOLUTION INTRAVENOUS; SUBCUTANEOUS at 15:13:00

## 2023-05-23 RX ADMIN — ONDANSETRON 4 MG: 2 INJECTION INTRAMUSCULAR; INTRAVENOUS at 16:08:00

## 2023-05-23 RX ADMIN — ROCURONIUM BROMIDE 50 MG: 10 INJECTION, SOLUTION INTRAVENOUS at 14:07:00

## 2023-05-23 RX ADMIN — IODIXANOL 20 ML: 320 INJECTION, SOLUTION INTRAVASCULAR at 16:20:00

## 2023-05-23 RX ADMIN — DEXAMETHASONE SODIUM PHOSPHATE 4 MG: 4 MG/ML VIAL (ML) INJECTION at 14:21:00

## 2023-05-23 RX ADMIN — SODIUM CHLORIDE: 9 INJECTION, SOLUTION INTRAVENOUS at 16:52:00

## 2023-05-23 RX ADMIN — SODIUM CHLORIDE: 9 INJECTION, SOLUTION INTRAVENOUS at 14:03:00

## 2023-05-23 RX ADMIN — LIDOCAINE HYDROCHLORIDE 50 MG: 10 INJECTION, SOLUTION EPIDURAL; INFILTRATION; INTRACAUDAL; PERINEURAL at 14:07:00

## 2023-05-23 RX ADMIN — HEPARIN SODIUM 2000 UNITS: 1000 INJECTION, SOLUTION INTRAVENOUS; SUBCUTANEOUS at 15:49:00

## 2023-05-23 NOTE — ANESTHESIA PROCEDURE NOTES
Arterial Line    Date/Time: 5/23/2023 2:10 PM    Performed by: Luis Lopez MD  Authorized by: Luis Lopez MD    General Information and Staff    Procedure Start:  5/23/2023 2:10 PM  Procedure End:  5/23/2023 2:13 PM  Anesthesiologist:  Luis Lopez MD  Performed By:  Anesthesiologist  Patient Location:  OR  Indication: continuous blood pressure monitoring and blood sampling needed    Site Identification: real time ultrasound guided and surface landmarks    Preanesthetic Checklist: 2 patient identifiers, IV checked, risks and benefits discussed, monitors and equipment checked, pre-op evaluation, timeout performed, anesthesia consent and sterile technique used    Procedure Details    Catheter Size:  20 G  Catheter Length:  1 and 3/4 inch  Catheter Type:  Arrow  Seldinger Technique?: Yes    Laterality:  Left  Site:  Radial artery  Site Prep: chlorhexidine    Line Secured:  Wrist Brace, tape and Tegaderm    Assessment    Events: patient tolerated procedure well with no complications      Medications  5/23/2023 2:10 PM      Additional Comments

## 2023-05-23 NOTE — PROCEDURES
PROCEDURE PERFORMED:   1. Amulet device implant. 2. Transseptal catheterization with left atrial pressure recording. SEDATION: General anesthesia. INDICATIONS FOR PROCEDURE: Meri Baez is a a(n) 82year old female. Presents with persistent NVAF, CHADSVasc score 6 (age-4, HTN, CHF, CVA)  to undergo ISRAEL occlusion as an alternative to long term anticoagulatio due to frequent falls and injury. OPERATORS: Ulysses Corbin MD, Parish Carver MD        DESCRIPTION OF PROCEDURE: The patient was brought to the endovascular suite in a fasting and nonsedated state after providing informed consent. Sedation was administered by the general anesthesia service. See separate report for vascular access and trasseptal puncture. Following transseptal access, a second heparin bolus was given followed by continuous heparin infusion. The needle and dilator were removed, and the SL1 sheath was aspirated and flushed. LA pressure was measured and recorded. A long pigtail catheter was used to enter the ISRAEL. An angiogram of the ISRAEL was recorded in an BENJAMIN caudal and cranial view. A long guidewire was then positioned in the left upper pulmonary vein. The SL1 sheath was removed, and the wire was retained in the left upper pulmonary vein. The short 14-Tajik sheath was also removed. The amulet delivery sheath was inserted over the wire to the left upper pulmonary vein. The amulet device was then loaded into the delivery sheath and advanced to the distal position of the delivery sheath. The sheath was withdrawn to expose a 'ball' shape of the device. The sheath and device was withdrawn and maneuvered to the ISRAEL. The amulet device was advanced to expose the full lobe of the device. Following review of the lobe position, the sheath was withdrawn to open the disc. An angiogram was taken of the position. Following echo and angiogram evaluation of the amulet device position, a tug test was performed for stability.  Stability evaluation included compression of the lobe, position of the lobe (2/3 distance past the circumflex artery), distance to the disc and confirming concave shape of the disc. After confirming stability, the device was released. There was no effusion by VANDANA. Also, a seal of the device was measured by color flow Doppler, the size of the residual leak was 28 mm. The device was then released from the delivery sheath, and the delivery sheath and access sheath were gently withdrawn into the right atrium. There was no pericardial effusion visualized. The right femoral vein was closed with the  previously placed Perclose devices. CONCLUSIONS:   1. Successful deployment of a 28 mm Amulet device. Residual leak was 0 mm  2. No pericardial effusion by VANDANA at the conclusion of the procedure. 3. The patient was transported to postanesthesia recovery in stable condition. 4. CHADSVasc score 6.       Juanis Phillips MD  [facility]  Cardiac Electrophysiolgy  [phone]

## 2023-05-23 NOTE — ANESTHESIA POSTPROCEDURE EVALUATION
[address] Patient Status:  Inpatient   Age/Gender 82year old female MRN [id number]   Location [address] [facility] Attending Irma Neal MD   [address] Day # 0 PCP Andriy Wray MD       Anesthesia Post-op Note        Procedure Summary     Date: 05/23/23 Room / Location: [address] [place] [address]    Anesthesia Start: 9790 Anesthesia Stop:     Procedure: CATH WATCHMAN Diagnosis:       Atrial fibrillation, unspecified type [facility])      Atrial fibrillation, unspecified type ([facility] [address]    Scheduled Providers: Nohemi Armenta MD Anesthesiologist: Tyrese Randolph MD    Anesthesia Type: general ASA Status: 3          Anesthesia Type: general    Vitals Value Taken Time   /58 05/23/23 1633   Temp 97.5 05/23/23 1633   Pulse 58 05/23/23 1633   Resp 16 05/23/23 1633   SpO2 96 05/23/23 1633       Patient Location: PACU    Anesthesia Type: general    Airway Patency: patent    Postop Pain Control: adequate    Mental Status: mildly sedated but able to meaningfully participate in the post-anesthesia evaluation    Nausea/Vomiting: none    Cardiopulmonary/Hydration status: stable euvolemic    Complications: no apparent anesthesia related complications    Postop vital signs: stable    Dental Exam: Unchanged from Preop    Patient to be discharged from PACU when criteria met.

## 2023-05-23 NOTE — ANESTHESIA PROCEDURE NOTES
Peripheral IV  Date/Time: 5/23/2023 2:15 PM  Inserted by: Cheri Jeffers MD    Placement  Needle size: 18 G  Laterality: right  Location: hand  Local anesthetic: none  Site prep: alcohol  Technique: anatomical landmarks  Attempts: 1

## 2023-05-23 NOTE — OPERATIVE REPORT
PROCEDURE PERFORMED:   1. Amulet device implant. 2. Transseptal catheterization with left atrial pressure recording. INDICATIONS FOR PROCEDURE: History of frequent falls. CHADSVasc score 5      OPERATORS: Saul Fraire MD    INDICATIONS FOR PROCEDURE: History of intolerance to anticoagulation. CHADSVasc score 5. DESCRIPTION OF PROCEDURE: The patient was brought to the endovascular suite in a fasting and nonsedated state after providing informed consent. Sedation was administered by the general anesthesia service. The right and left groins were prepped and draped in a sterile fashion. Using ultrasound, right comfortable vein was identified and entered using direct visualization. 10 Greenlandic sheath was placed. Heparin was given. Next, we tried a safe cross device. However, we had technical issues. Therefore we switched to a versa connect sheath. Fluoroscopic MD landmarks, interatrial septum was stented in a mid and inferior location. Were able to get across with RF energy without difficulty with placement of the versa cross sheath in the left atrium. Heparin was given additionally to maintain ACT over 250 seconds. Amulet sheath was placed in the left atrium. Please see Dr. Bekah Bran note regarding amulet delivery. At the end of the case a Vascade device was used to achieve hemostasis. No acute complications were noted      CONCLUSIONS:   1. Successful deployment of a 25 mm Amulet device. Deployment was confirmed by demonstrating the appropriate CLOSE criteria. This is outlined in the procedure note. 2. No pericardial effusion by VANDANA at the conclusion of the procedure. 3. The patient was transported to postanesthesia recovery in stable condition. 4. CHADSVasc score 5.       MD JORDON Fraire

## 2023-05-23 NOTE — ANESTHESIA PROCEDURE NOTES
Airway  Date/Time: 5/23/2023 2:09 PM  Urgency: elective    Airway not difficult    General Information and Staff    Patient location during procedure: OR  Anesthesiologist: Donn Gallego MD  Performed: anesthesiologist   Performed by: Donn Gallego MD  Authorized by: Donn Gallego MD      Indications and Patient Condition  Indications for airway management: anesthesia  Sedation level: deep  Preoxygenated: yes  Patient position: sniffing  Mask difficulty assessment: 1 - vent by mask    Final Airway Details  Final airway type: endotracheal airway      Successful airway: ETT  Cuffed: yes   Successful intubation technique: direct laryngoscopy  Endotracheal tube insertion site: oral  Blade: Argelia  Blade size: #3  ETT size (mm): 7.0    Cormack-Lehane Classification: grade IIB - view of arytenoids or posterior of glottis only  Placement verified by: chest auscultation and capnometry   Cuff volume (mL): 9  Measured from: lips  ETT to lips (cm): 21  Number of attempts at approach: 1

## 2023-05-23 NOTE — PROGRESS NOTES
Pt received s/p Amulet placement with Dr. Eneida Fuller from PACU. Right femoral venous access site closed with vascade. Site CDI, no bleedin/oozing present. Dr. Eneida Fuller at bedside and spoke with pt's . Recovery completed. Discharge instructions given to pt and pt's family members. IVs discontinued, pt taken down to [facility] via wheelchair for discharge. Pt's  driving pt home.

## 2023-05-24 LAB
BLOOD TYPE BARCODE: 6200
UNIT VOLUME: 350 ML

## 2023-06-05 ENCOUNTER — TELEPHONE (OUTPATIENT)
Dept: CARDIOLOGY CLINIC | Facility: HOSPITAL | Age: 83
End: 2023-06-05

## 2023-06-05 NOTE — TELEPHONE ENCOUNTER
Informed that [address] f/u VANDANA will be in August. Will send her implant info, she states card was not given at hospital discharge. Ok to have colonoscopy, will need to hold Plavix as directed.

## 2023-07-06 ENCOUNTER — TELEPHONE (OUTPATIENT)
Dept: CARDIOLOGY CLINIC | Facility: HOSPITAL | Age: 83
End: 2023-07-06

## 2023-07-28 NOTE — PAT NURSING NOTE
Per PAT phone encounter:    Pt's power went out, calling back on cell; updated rest of the chart. Discussed phone call after 3pm for TOA, when to be NPO, which meds to hold (Diuretics (Lasix, Inspra), herbal supplements, minerals), take meds allowed to take with sip of water, reviewed possible swallowing difficulty (hx Jenkins's esophagus, Throat cancer s/p radiation, etc) or loose teeth, main entrance arrival, check into heart hospital reception desk, length of procedure and length to expect to be at hospital if all goes well, etc. All questions answered and pt verbalized understanding of conversation. 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 Sent message to [facility] about information.

## 2023-08-02 ENCOUNTER — HOSPITAL ENCOUNTER (OUTPATIENT)
Dept: INTERVENTIONAL RADIOLOGY/VASCULAR | Facility: HOSPITAL | Age: 83
Discharge: HOME | End: 2023-08-02
Attending: INTERNAL MEDICINE | Admitting: INTERNAL MEDICINE
Payer: MEDICARE

## 2023-08-02 ENCOUNTER — HOSPITAL ENCOUNTER (OUTPATIENT)
Dept: CV DIAGNOSTICS | Facility: HOSPITAL | Age: 83
Discharge: HOME | End: 2023-08-02
Attending: INTERNAL MEDICINE
Payer: MEDICARE

## 2023-08-02 VITALS
RESPIRATION RATE: 14 BRPM | OXYGEN SATURATION: 96 % | DIASTOLIC BLOOD PRESSURE: 101 MMHG | BODY MASS INDEX: 26.33 KG/M2 | HEART RATE: 44 BPM | WEIGHT: 158 LBS | HEIGHT: 65 IN | TEMPERATURE: 98 F | SYSTOLIC BLOOD PRESSURE: 118 MMHG

## 2023-08-02 DIAGNOSIS — I48.91 A-FIB (HCC): ICD-10-CM

## 2023-08-02 LAB
ATRIAL RATE: 47 BPM
P AXIS: 51 DEGREES
P-R INTERVAL: 200 MS
Q-T INTERVAL: 506 MS
QRS DURATION: 100 MS
QTC CALCULATION (BEZET): 447 MS
R AXIS: -32 DEGREES
T AXIS: 12 DEGREES
VENTRICULAR RATE: 47 BPM

## 2023-08-02 PROCEDURE — 93312 ECHO TRANSESOPHAGEAL: CPT | Performed by: INTERNAL MEDICINE

## 2023-08-02 PROCEDURE — 99152 MOD SED SAME PHYS/QHP 5/>YRS: CPT | Performed by: INTERNAL MEDICINE

## 2023-08-02 PROCEDURE — 93320 DOPPLER ECHO COMPLETE: CPT | Performed by: INTERNAL MEDICINE

## 2023-08-02 PROCEDURE — B24BZZ4 ULTRASONOGRAPHY OF HEART WITH AORTA, TRANSESOPHAGEAL: ICD-10-PCS | Performed by: INTERNAL MEDICINE

## 2023-08-02 PROCEDURE — 93325 DOPPLER ECHO COLOR FLOW MAPG: CPT | Performed by: INTERNAL MEDICINE

## 2023-08-02 PROCEDURE — 93010 ELECTROCARDIOGRAM REPORT: CPT | Performed by: INTERNAL MEDICINE

## 2023-08-02 PROCEDURE — 93005 ELECTROCARDIOGRAM TRACING: CPT

## 2023-08-02 RX ORDER — SODIUM CHLORIDE 9 MG/ML
INJECTION, SOLUTION INTRAVENOUS
Status: DISCONTINUED | OUTPATIENT
Start: 2023-08-03 | End: 2023-08-02

## 2023-08-02 RX ORDER — MIDAZOLAM HYDROCHLORIDE 1 MG/ML
INJECTION INTRAMUSCULAR; INTRAVENOUS
Status: COMPLETED
Start: 2023-08-02 | End: 2023-08-02

## 2023-08-02 RX ORDER — METHIMAZOLE 5 MG/1
5 TABLET ORAL DAILY
COMMUNITY

## 2023-08-02 RX ORDER — MIDAZOLAM HYDROCHLORIDE 1 MG/ML
3 INJECTION INTRAMUSCULAR; INTRAVENOUS ONCE
Status: COMPLETED | OUTPATIENT
Start: 2023-08-02 | End: 2023-08-02

## 2023-08-02 RX ADMIN — MIDAZOLAM HYDROCHLORIDE 3 MG: 1 INJECTION INTRAMUSCULAR; INTRAVENOUS at 09:00:00

## 2023-08-02 NOTE — H&P
History & Physical Examination    Patient Name: Holley Pineda  MRN: [id number]  CSN: [id number]  YOB: 1940    Diagnosis: Afib s/p AMULET LAAO device    History of Present Illness: Holley Pineda is a 82year old female s/p AMULET LAAO device on 5/23/2023 that presents for ~45 day post-device assessment via VANDANA. methIMAzole 5 MG Oral Tab, Take 1 tablet (5 mg total) by mouth daily. , Disp: , Rfl: , 8/1/2023  CRANBERRY-VITAMIN C-D MANNOSE OR, Take by mouth., Disp: , Rfl: , Past Week  clopidogrel 75 MG Oral Tab, Take 1 tablet (75 mg total) by mouth daily. , Disp: 30 tablet, Rfl: 3, 8/1/2023  gabapentin 100 MG Oral Cap, Take 1 capsule (100 mg total) by mouth nightly., Disp: , Rfl: , Past Week  amiodarone 200 MG Oral Tab, Take 1 tablet (200 mg total) by mouth daily. , Disp: , Rfl: , 8/1/2023  omeprazole 20 MG Oral Capsule Delayed Release, Take 1 capsule (20 mg total) by mouth before breakfast., Disp: 30 capsule, Rfl: 0, 8/1/2023  ergocalciferol 1.25 MG (67080 UT) Oral Cap, Take 1 capsule (50,000 Units total) by mouth once a week., Disp: 12 capsule, Rfl: 1, Past Week  dilTIAZem 240 MG Oral Capsule SR 24 Hr, Take 1 capsule (240 mg total) by mouth daily. , Disp: 30 capsule, Rfl: 3, 8/2/2023  eplerenone 25 MG Oral Tab, Take 0.5 tablets (12.5 mg total) by mouth daily. , Disp: 45 tablet, Rfl: 1, 8/1/2023  metoprolol succinate 25 MG Oral Tablet 24 Hr, Take 1 tablet (25 mg total) by mouth Daily Beta Blocker. , Disp: 30 tablet, Rfl: 3, 8/2/2023  furosemide 20 MG Oral Tab, Take 1 tablet (20 mg total) by mouth daily. , Disp: 30 tablet, Rfl: 3, 8/1/2023  aspirin 81 MG Oral Tab EC, Take 1 tablet (81 mg total) by mouth daily. , Disp: , Rfl: , 8/2/2023  rosuvastatin 10 MG Oral Tab, Take 1 tablet (10 mg total) by mouth daily. , Disp: , Rfl: , 8/1/2023  hydrocortisone 2.5 % Rectal Cream, Apply to the affected area twice daily as needed with symptoms, Disp: 28.35 g, Rfl: 2  B Complex Vitamins (VITAMIN B COMPLEX OR), Take 1 tablet by mouth daily. , Disp: , Rfl: , Past Week  CYMBALTA 60 MG Oral Cap DR Particles, , Disp: , Rfl: , 8/1/2023        Allergies:    Avelox [Moxifloxaci*        Comment:Weakness  Cephalexin              RASH  Ciprofloxacin           OTHER (SEE COMMENTS)    Comment:tendonitis  Morphine                ITCHING  Vantin                  RASH  Cefpodoxime             OTHER (SEE COMMENTS)  Lipitor [Atorvastat*    RASH  Nifedipine              FATIGUE    Comment:Oral    Past Medical History:   Diagnosis Date    Allergy to mold spores     Arrhythmia     AFIB    Arthritis     Back problem     Dry eyes     Dust allergy     Esophageal reflux     Fibromyalgia     Neurologist     High blood cholesterol     High blood pressure     Hypertension     Cardiologist, Dr. Wheeler Frame - annually    Hypothyroidism     IBS (irritable bowel syndrome)     Osteopenia 05/29/2015    Scoliosis     No surgical intervention, taking physical therapy     Stroke [facility])     Thyroid disease     Endocrinologist followed      Past Surgical History:   Procedure Laterality Date    APPENDECTOMY      CATARACT      CHOLECYSTECTOMY      KNEE REPLACEMENT SURGERY Left     KNEE SURGERY  8-29-12    LEFT TKA - Dr. Mami Chaudharyr DETACH, CPLX      TONSILLECTOMY       Family History   Problem Relation Age of Onset    Hypertension Father     Cancer Father     Hypertension Mother     Heart Disease Mother     Cancer Mother     Heart Disease Maternal Grandmother     Hypertension Maternal Grandmother      Social History    Tobacco Use      Smoking status: Never      Smokeless tobacco: Never    Alcohol use: No      SYSTEM Check if Review is Normal Check if Physical Exam is Normal If not normal, please explain:   HEENT [ x] [x ]    NECK & BACK [x ] [x ]    HEART [x ] [x ]    LUNGS [x ] [x ]    ABDOMEN [x ] [ x]    UROGENITAL [x ] [ defer]    EXTREMITIES [x ] [x ]    OTHER        Plan:   ( x)  I have discussed the risks,  benefits, and alternatives with the patient/family, they understand and agree to proceed with plan of care for VANDANA.      Lee Greer MD  8/2/2023  8:07 AM

## 2023-08-02 NOTE — IVS NOTE
Bedside VANDANA completed. Secretions clear. VSS. After one hour recovery patient discharged home in stable condition. Dr. Nancy Beck spoke to patient's family post procedure. AVS reviewed. PIV removed. Discharge via wheelchair with all belongings.  driving home.

## 2023-08-02 NOTE — PROCEDURES
Cardiology 3D Transesophageal Echo Note    PRE-PROCEDURE DIAGNOSIS:  Atrial fibrillation and 45 day post-Amulet device assessment    PROCEDURE: Transesophageal Echocardiogram.    SEDATION:   Topical spray x 1  Versed: 3 mg  Fentanyl: 75 mcg  I personally supervised the intravenous administration of conscious sedation. This was performed with continuous respiratory, hemodynamic, and electrocardiographic monitoring. Sedation was supervised from 8:17 AM - 8:39 AM, a total of 22 minutes. DESCRIPTION:   Informed consent was obtained after risks and benefits of the procedure were explained. Oral hurricaine spray was placed in the oropharynx. Conscious sedation was given. After adequate anesthesia, the VANDANA probe was placed in the oropharynx with the esophagus being successfully intubated. Standard transgastric and multiplane views were obtained and available findings are as follows:    COMPLICATIONS: none    FINDINGS:   Overall normal LVSF with an estimated LVEF 60-65% and without wall motion abnormalities. Chamber sizes were within normal limits. Left atrial enlargement. Mild prolapse of the mitral valve with mild mitral insufficiency. There was no evidence for reversal of flow in the pulmonary veins. There was no intracardiac shunting or evidence of ASD or PFO by color flow interrogation and agitated saline bubble study. The aorta had no significant atherosclerotic plaque but no evidence for mobile atheromata or thrombus. No pericardial effusion. 28 mm Amulet device in left atrial appendage (ISRAEL) appears well seated and no excessive mobility  Color flow interrogation revealed no kalia-device flow  There was no evidence for intracardiac mass or thrombus over the Amulet device    3-Dimensional Echocardiography performed for evaluation of mitral regurgitation and AMULET device. Recommend to continue aspirin 81 mg daily and plavix 75 mg daily at least for 6 months since implant of device.     Loida Hutson, MD  8/2/2023  8:50 AM

## 2023-10-03 ENCOUNTER — LAB ENCOUNTER (OUTPATIENT)
Dept: LAB | Facility: HOSPITAL | Age: 83
End: 2023-10-03
Attending: INTERNAL MEDICINE
Payer: MEDICARE

## 2023-10-03 DIAGNOSIS — E78.2 MIXED HYPERLIPIDEMIA: ICD-10-CM

## 2023-10-03 DIAGNOSIS — I10 ESSENTIAL HYPERTENSION, MALIGNANT: Primary | ICD-10-CM

## 2023-10-03 LAB
ALBUMIN SERPL-MCNC: 3.2 G/DL (ref 3.4–5)
ALBUMIN/GLOB SERPL: 0.9 {RATIO} (ref 1–2)
ALP LIVER SERPL-CCNC: 119 U/L
ALT SERPL-CCNC: 46 U/L
ANION GAP SERPL CALC-SCNC: 3 MMOL/L (ref 0–18)
AST SERPL-CCNC: 25 U/L (ref 15–37)
BILIRUB SERPL-MCNC: 0.6 MG/DL (ref 0.1–2)
BUN BLD-MCNC: 22 MG/DL (ref 7–18)
CALCIUM BLD-MCNC: 9.2 MG/DL (ref 8.5–10.1)
CHLORIDE SERPL-SCNC: 107 MMOL/L (ref 98–112)
CHOLEST SERPL-MCNC: 129 MG/DL (ref ?–200)
CO2 SERPL-SCNC: 31 MMOL/L (ref 21–32)
CREAT BLD-MCNC: 1.15 MG/DL
EGFRCR SERPLBLD CKD-EPI 2021: 48 ML/MIN/1.73M2 (ref 60–?)
FASTING PATIENT LIPID ANSWER: YES
FASTING STATUS PATIENT QL REPORTED: YES
GLOBULIN PLAS-MCNC: 3.4 G/DL (ref 2.8–4.4)
GLUCOSE BLD-MCNC: 95 MG/DL (ref 70–99)
HDLC SERPL-MCNC: 49 MG/DL (ref 40–59)
LDLC SERPL CALC-MCNC: 60 MG/DL (ref ?–100)
NONHDLC SERPL-MCNC: 80 MG/DL (ref ?–130)
OSMOLALITY SERPL CALC.SUM OF ELEC: 295 MOSM/KG (ref 275–295)
POTASSIUM SERPL-SCNC: 3.9 MMOL/L (ref 3.5–5.1)
PROT SERPL-MCNC: 6.6 G/DL (ref 6.4–8.2)
SODIUM SERPL-SCNC: 141 MMOL/L (ref 136–145)
TRIGL SERPL-MCNC: 111 MG/DL (ref 30–149)
VLDLC SERPL CALC-MCNC: 16 MG/DL (ref 0–30)

## 2023-10-03 PROCEDURE — 36415 COLL VENOUS BLD VENIPUNCTURE: CPT

## 2023-10-03 PROCEDURE — 80053 COMPREHEN METABOLIC PANEL: CPT

## 2023-10-03 PROCEDURE — 80061 LIPID PANEL: CPT

## 2023-12-04 ENCOUNTER — TELEPHONE (OUTPATIENT)
Dept: CARDIOLOGY CLINIC | Facility: HOSPITAL | Age: 83
End: 2023-12-04

## 2024-03-14 NOTE — IMAGING NOTE
Call placed to pt regarding CTA Gated Pulmonary Vein.   Instructed to arrive at 12:45.  May eat a light breakfast/lunch but drink plenty of fluids a day before and morning of procedure..   Advised to hold caffeine 12 hrs prior to procedure.   May take usual meds.

## 2024-03-19 ENCOUNTER — HOSPITAL ENCOUNTER (OUTPATIENT)
Dept: CT IMAGING | Facility: HOSPITAL | Age: 84
Discharge: HOME | End: 2024-03-19
Attending: INTERNAL MEDICINE
Payer: MEDICARE

## 2024-03-19 VITALS — HEART RATE: 46 BPM | SYSTOLIC BLOOD PRESSURE: 131 MMHG | DIASTOLIC BLOOD PRESSURE: 57 MMHG

## 2024-03-19 DIAGNOSIS — I48.91 ATRIAL FIBRILLATION WITH RAPID VENTRICULAR RESPONSE (HCC): ICD-10-CM

## 2024-03-19 DIAGNOSIS — I49.3 FREQUENT PVCS: ICD-10-CM

## 2024-03-19 LAB
CREAT BLD-MCNC: 1.4 MG/DL
EGFRCR SERPLBLD CKD-EPI 2021: 38 ML/MIN/1.73M2 (ref 60–?)

## 2024-03-19 PROCEDURE — 82565 ASSAY OF CREATININE: CPT

## 2024-03-19 PROCEDURE — 75572 CT HRT W/3D IMAGE: CPT | Performed by: INTERNAL MEDICINE

## 2024-03-19 NOTE — IMAGING NOTE
Kelly to CT Rm 4 for gated pulmonary vein study     Positioned pt on table. Procedure explained and questions answered. Vital signs monitored and noted in Flowsheet.  GFR = 38    Contrast injected followed by saline flush at 1316  Contrast = 55 ml  0.9 NS flush = 63 ml  HR during scan = 43 BPM     Patient tolerated the procedure without complication. Denies any contrast reaction. Discontinued IV saline lock w/ coban drsg applied. Patient instructed to hydrate well for next 48 hrs to facilitate contrast excretion.   Escorted pt to dressing room and discharged in stable condition.

## 2024-04-10 ENCOUNTER — LAB ENCOUNTER (OUTPATIENT)
Dept: LAB | Facility: HOSPITAL | Age: 84
End: 2024-04-10
Attending: INTERNAL MEDICINE
Payer: MEDICARE

## 2024-04-10 DIAGNOSIS — I34.1 MVP (MITRAL VALVE PROLAPSE): ICD-10-CM

## 2024-04-10 DIAGNOSIS — I10 HYPERTENSION, ESSENTIAL: ICD-10-CM

## 2024-04-10 DIAGNOSIS — I63.9 STROKE (CEREBRUM) (HCC): ICD-10-CM

## 2024-04-10 DIAGNOSIS — E78.2 MIXED HYPERLIPIDEMIA: ICD-10-CM

## 2024-04-10 DIAGNOSIS — I48.91 A-FIB (HCC): Primary | ICD-10-CM

## 2024-04-10 LAB
ALBUMIN SERPL-MCNC: 3.5 G/DL (ref 3.4–5)
ALBUMIN/GLOB SERPL: 1.1 {RATIO} (ref 1–2)
ALP LIVER SERPL-CCNC: 74 U/L
ALT SERPL-CCNC: 48 U/L
ANION GAP SERPL CALC-SCNC: 5 MMOL/L (ref 0–18)
AST SERPL-CCNC: 29 U/L (ref 15–37)
BILIRUB SERPL-MCNC: 0.8 MG/DL (ref 0.1–2)
BUN BLD-MCNC: 24 MG/DL (ref 9–23)
CALCIUM BLD-MCNC: 9.4 MG/DL (ref 8.5–10.1)
CHLORIDE SERPL-SCNC: 105 MMOL/L (ref 98–112)
CHOLEST SERPL-MCNC: 166 MG/DL (ref ?–200)
CO2 SERPL-SCNC: 28 MMOL/L (ref 21–32)
CREAT BLD-MCNC: 1.14 MG/DL
EGFRCR SERPLBLD CKD-EPI 2021: 48 ML/MIN/1.73M2 (ref 60–?)
FASTING PATIENT LIPID ANSWER: YES
FASTING STATUS PATIENT QL REPORTED: YES
GLOBULIN PLAS-MCNC: 3.2 G/DL (ref 2.8–4.4)
GLUCOSE BLD-MCNC: 111 MG/DL (ref 70–99)
HDLC SERPL-MCNC: 64 MG/DL (ref 40–59)
LDLC SERPL CALC-MCNC: 80 MG/DL (ref ?–100)
NONHDLC SERPL-MCNC: 102 MG/DL (ref ?–130)
OSMOLALITY SERPL CALC.SUM OF ELEC: 291 MOSM/KG (ref 275–295)
POTASSIUM SERPL-SCNC: 4.1 MMOL/L (ref 3.5–5.1)
PROT SERPL-MCNC: 6.7 G/DL (ref 6.4–8.2)
SODIUM SERPL-SCNC: 138 MMOL/L (ref 136–145)
TRIGL SERPL-MCNC: 123 MG/DL (ref 30–149)
VLDLC SERPL CALC-MCNC: 19 MG/DL (ref 0–30)

## 2024-04-10 PROCEDURE — 36415 COLL VENOUS BLD VENIPUNCTURE: CPT

## 2024-04-10 PROCEDURE — 80061 LIPID PANEL: CPT

## 2024-04-10 PROCEDURE — 80053 COMPREHEN METABOLIC PANEL: CPT

## 2024-04-29 ENCOUNTER — ANESTHESIA EVENT (OUTPATIENT)
Dept: INTERVENTIONAL RADIOLOGY/VASCULAR | Facility: HOSPITAL | Age: 84
End: 2024-04-29
Payer: MEDICARE

## 2024-04-29 NOTE — PAT NURSING NOTE
PreOp Instructions     You are scheduled for: a Cardiac Procedure     Date of Procedure: 05/01/24     Diet Instructions: Do not eat or drink anything after midnight     Medications: Medications you are allowed to take can be taken with a sip of water the morning of your procedure. Hold eliquis, furosemide and eplerenone morning of procedure.     Skin Prep: Shower with antibacterial soap using a clean washcloth, prior to procedure     Arrival Time: The day prior to your procedure you will receive a phone call before 6:00 pm with your arrival time. If you haven't received a phone call, please check your voicemail messages., If you did not receive a voice mail and it is after 6:00 pm, please call the nursing supervisor at [phone].    Driving After Procedure: If sedation is given, you WILL NOT be able to drive home. You will need a responsible adult  to drive you home.     Discharge Teaching: Your nurse will give you specific instructions before discharge, Most people can resume normal activities in 2-3 days, Any questions, please call the physician's office

## 2024-05-01 ENCOUNTER — ANESTHESIA (OUTPATIENT)
Dept: INTERVENTIONAL RADIOLOGY/VASCULAR | Facility: HOSPITAL | Age: 84
End: 2024-05-01
Payer: MEDICARE

## 2024-05-01 ENCOUNTER — HOSPITAL ENCOUNTER (OUTPATIENT)
Dept: INTERVENTIONAL RADIOLOGY/VASCULAR | Facility: HOSPITAL | Age: 84
Discharge: HOME | End: 2024-05-02
Attending: INTERNAL MEDICINE | Admitting: INTERNAL MEDICINE
Payer: MEDICARE

## 2024-05-01 DIAGNOSIS — I48.91 A-FIB (HCC): ICD-10-CM

## 2024-05-01 LAB
ISTAT ACTIVATED CLOTTING TIME: 309 SECONDS (ref 74–137)
ISTAT ACTIVATED CLOTTING TIME: 385 SECONDS (ref 74–137)

## 2024-05-01 PROCEDURE — 93656 COMPRE EP EVAL ABLTJ ATR FIB: CPT | Performed by: INTERNAL MEDICINE

## 2024-05-01 PROCEDURE — B24CZZZ ULTRASONOGRAPHY OF PERICARDIUM: ICD-10-PCS | Performed by: INTERNAL MEDICINE

## 2024-05-01 PROCEDURE — 4A023FZ MEASUREMENT OF CARDIAC RHYTHM, PERCUTANEOUS APPROACH: ICD-10-PCS | Performed by: INTERNAL MEDICINE

## 2024-05-01 PROCEDURE — B246ZZZ ULTRASONOGRAPHY OF RIGHT AND LEFT HEART: ICD-10-PCS | Performed by: INTERNAL MEDICINE

## 2024-05-01 PROCEDURE — 02583ZZ DESTRUCTION OF CONDUCTION MECHANISM, PERCUTANEOUS APPROACH: ICD-10-PCS | Performed by: INTERNAL MEDICINE

## 2024-05-01 PROCEDURE — 85347 COAGULATION TIME ACTIVATED: CPT

## 2024-05-01 PROCEDURE — 4A0234Z MEASUREMENT OF CARDIAC ELECTRICAL ACTIVITY, PERCUTANEOUS APPROACH: ICD-10-PCS | Performed by: INTERNAL MEDICINE

## 2024-05-01 PROCEDURE — 93655 ICAR CATH ABLTJ DSCRT ARRHYT: CPT | Performed by: INTERNAL MEDICINE

## 2024-05-01 PROCEDURE — 02K83ZZ MAP CONDUCTION MECHANISM, PERCUTANEOUS APPROACH: ICD-10-PCS | Performed by: INTERNAL MEDICINE

## 2024-05-01 RX ORDER — SODIUM CHLORIDE, SODIUM LACTATE, POTASSIUM CHLORIDE, CALCIUM CHLORIDE 600; 310; 30; 20 MG/100ML; MG/100ML; MG/100ML; MG/100ML
INJECTION, SOLUTION INTRAVENOUS CONTINUOUS
Status: DISCONTINUED | OUTPATIENT
Start: 2024-05-01 | End: 2024-05-01 | Stop reason: HOSPADM

## 2024-05-01 RX ORDER — HYDROCODONE BITARTRATE AND ACETAMINOPHEN 5; 325 MG/1; MG/1
2 TABLET ORAL ONCE AS NEEDED
Status: DISCONTINUED | OUTPATIENT
Start: 2024-05-01 | End: 2024-05-01 | Stop reason: HOSPADM

## 2024-05-01 RX ORDER — PHENYLEPHRINE HCL 10 MG/ML
VIAL (ML) INJECTION AS NEEDED
Status: DISCONTINUED | OUTPATIENT
Start: 2024-05-01 | End: 2024-05-01 | Stop reason: SURG

## 2024-05-01 RX ORDER — ONDANSETRON 2 MG/ML
4 INJECTION INTRAMUSCULAR; INTRAVENOUS EVERY 6 HOURS PRN
Status: DISCONTINUED | OUTPATIENT
Start: 2024-05-01 | End: 2024-05-01 | Stop reason: HOSPADM

## 2024-05-01 RX ORDER — EPHEDRINE SULFATE 50 MG/ML
INJECTION INTRAVENOUS AS NEEDED
Status: DISCONTINUED | OUTPATIENT
Start: 2024-05-01 | End: 2024-05-01 | Stop reason: SURG

## 2024-05-01 RX ORDER — HEPARIN SODIUM 5000 [USP'U]/ML
INJECTION, SOLUTION INTRAVENOUS; SUBCUTANEOUS
Status: COMPLETED
Start: 2024-05-01 | End: 2024-05-01

## 2024-05-01 RX ORDER — HEPARIN SODIUM 1000 [USP'U]/ML
INJECTION, SOLUTION INTRAVENOUS; SUBCUTANEOUS
Status: COMPLETED
Start: 2024-05-01 | End: 2024-05-01

## 2024-05-01 RX ORDER — PROTAMINE SULFATE 10 MG/ML
INJECTION, SOLUTION INTRAVENOUS AS NEEDED
Status: DISCONTINUED | OUTPATIENT
Start: 2024-05-01 | End: 2024-05-01 | Stop reason: SURG

## 2024-05-01 RX ORDER — HYDROMORPHONE HYDROCHLORIDE 1 MG/ML
0.2 INJECTION, SOLUTION INTRAMUSCULAR; INTRAVENOUS; SUBCUTANEOUS EVERY 5 MIN PRN
Status: DISCONTINUED | OUTPATIENT
Start: 2024-05-01 | End: 2024-05-01 | Stop reason: HOSPADM

## 2024-05-01 RX ORDER — SODIUM CHLORIDE 9 MG/ML
INJECTION, SOLUTION INTRAVENOUS
Status: COMPLETED | OUTPATIENT
Start: 2024-05-02 | End: 2024-05-01

## 2024-05-01 RX ORDER — LABETALOL HYDROCHLORIDE 5 MG/ML
5 INJECTION, SOLUTION INTRAVENOUS EVERY 5 MIN PRN
Status: DISCONTINUED | OUTPATIENT
Start: 2024-05-01 | End: 2024-05-01 | Stop reason: HOSPADM

## 2024-05-01 RX ORDER — ACETAMINOPHEN 500 MG
1000 TABLET ORAL ONCE AS NEEDED
Status: DISCONTINUED | OUTPATIENT
Start: 2024-05-01 | End: 2024-05-01 | Stop reason: HOSPADM

## 2024-05-01 RX ORDER — METOPROLOL TARTRATE 1 MG/ML
2.5 INJECTION, SOLUTION INTRAVENOUS ONCE
Status: DISCONTINUED | OUTPATIENT
Start: 2024-05-01 | End: 2024-05-01 | Stop reason: HOSPADM

## 2024-05-01 RX ORDER — ROCURONIUM BROMIDE 10 MG/ML
INJECTION, SOLUTION INTRAVENOUS AS NEEDED
Status: DISCONTINUED | OUTPATIENT
Start: 2024-05-01 | End: 2024-05-01 | Stop reason: SURG

## 2024-05-01 RX ORDER — HEPARIN SODIUM 1000 [USP'U]/ML
INJECTION, SOLUTION INTRAVENOUS; SUBCUTANEOUS AS NEEDED
Status: DISCONTINUED | OUTPATIENT
Start: 2024-05-01 | End: 2024-05-01 | Stop reason: SURG

## 2024-05-01 RX ORDER — NEOSTIGMINE METHYLSULFATE 1 MG/ML
INJECTION, SOLUTION INTRAVENOUS AS NEEDED
Status: DISCONTINUED | OUTPATIENT
Start: 2024-05-01 | End: 2024-05-01 | Stop reason: SURG

## 2024-05-01 RX ORDER — AMIODARONE HYDROCHLORIDE 200 MG/1
100 TABLET ORAL DAILY
Qty: 60 TABLET | Refills: 2 | Status: SHIPPED | OUTPATIENT
Start: 2024-05-01

## 2024-05-01 RX ORDER — HYDROCODONE BITARTRATE AND ACETAMINOPHEN 5; 325 MG/1; MG/1
1 TABLET ORAL ONCE AS NEEDED
Status: DISCONTINUED | OUTPATIENT
Start: 2024-05-01 | End: 2024-05-01 | Stop reason: HOSPADM

## 2024-05-01 RX ORDER — PROTAMINE SULFATE 10 MG/ML
INJECTION, SOLUTION INTRAVENOUS
Status: COMPLETED
Start: 2024-05-01 | End: 2024-05-01

## 2024-05-01 RX ORDER — GABAPENTIN 100 MG/1
100 CAPSULE ORAL NIGHTLY
Status: DISCONTINUED | OUTPATIENT
Start: 2024-05-01 | End: 2024-05-02

## 2024-05-01 RX ORDER — NALOXONE HYDROCHLORIDE 0.4 MG/ML
80 INJECTION, SOLUTION INTRAMUSCULAR; INTRAVENOUS; SUBCUTANEOUS AS NEEDED
Status: DISCONTINUED | OUTPATIENT
Start: 2024-05-01 | End: 2024-05-01 | Stop reason: HOSPADM

## 2024-05-01 RX ORDER — MEPERIDINE HYDROCHLORIDE 25 MG/ML
12.5 INJECTION INTRAMUSCULAR; INTRAVENOUS; SUBCUTANEOUS AS NEEDED
Status: DISCONTINUED | OUTPATIENT
Start: 2024-05-01 | End: 2024-05-01 | Stop reason: HOSPADM

## 2024-05-01 RX ORDER — ONDANSETRON 2 MG/ML
INJECTION INTRAMUSCULAR; INTRAVENOUS AS NEEDED
Status: DISCONTINUED | OUTPATIENT
Start: 2024-05-01 | End: 2024-05-01 | Stop reason: SURG

## 2024-05-01 RX ORDER — DEXAMETHASONE SODIUM PHOSPHATE 4 MG/ML
VIAL (ML) INJECTION AS NEEDED
Status: DISCONTINUED | OUTPATIENT
Start: 2024-05-01 | End: 2024-05-01 | Stop reason: SURG

## 2024-05-01 RX ORDER — METOCLOPRAMIDE HYDROCHLORIDE 5 MG/ML
INJECTION INTRAMUSCULAR; INTRAVENOUS AS NEEDED
Status: DISCONTINUED | OUTPATIENT
Start: 2024-05-01 | End: 2024-05-01 | Stop reason: SURG

## 2024-05-01 RX ORDER — LIDOCAINE HYDROCHLORIDE 10 MG/ML
INJECTION, SOLUTION EPIDURAL; INFILTRATION; INTRACAUDAL; PERINEURAL
Status: COMPLETED
Start: 2024-05-01 | End: 2024-05-01

## 2024-05-01 RX ORDER — GLYCOPYRROLATE 0.2 MG/ML
INJECTION, SOLUTION INTRAMUSCULAR; INTRAVENOUS AS NEEDED
Status: DISCONTINUED | OUTPATIENT
Start: 2024-05-01 | End: 2024-05-01 | Stop reason: SURG

## 2024-05-01 RX ORDER — CHLORHEXIDINE GLUCONATE 40 MG/ML
30 SOLUTION TOPICAL
Status: DISCONTINUED | OUTPATIENT
Start: 2024-05-02 | End: 2024-05-01 | Stop reason: HOSPADM

## 2024-05-01 RX ORDER — HYDROMORPHONE HYDROCHLORIDE 1 MG/ML
0.6 INJECTION, SOLUTION INTRAMUSCULAR; INTRAVENOUS; SUBCUTANEOUS EVERY 5 MIN PRN
Status: DISCONTINUED | OUTPATIENT
Start: 2024-05-01 | End: 2024-05-01 | Stop reason: HOSPADM

## 2024-05-01 RX ORDER — HYDROMORPHONE HYDROCHLORIDE 1 MG/ML
0.4 INJECTION, SOLUTION INTRAMUSCULAR; INTRAVENOUS; SUBCUTANEOUS EVERY 5 MIN PRN
Status: DISCONTINUED | OUTPATIENT
Start: 2024-05-01 | End: 2024-05-01 | Stop reason: HOSPADM

## 2024-05-01 RX ORDER — METOCLOPRAMIDE HYDROCHLORIDE 5 MG/ML
10 INJECTION INTRAMUSCULAR; INTRAVENOUS EVERY 8 HOURS PRN
Status: DISCONTINUED | OUTPATIENT
Start: 2024-05-01 | End: 2024-05-01 | Stop reason: HOSPADM

## 2024-05-01 RX ADMIN — METOCLOPRAMIDE HYDROCHLORIDE 10 MG: 5 INJECTION INTRAMUSCULAR; INTRAVENOUS at 15:02:00

## 2024-05-01 RX ADMIN — GLYCOPYRROLATE 0.2 MG: 0.2 INJECTION, SOLUTION INTRAMUSCULAR; INTRAVENOUS at 15:02:00

## 2024-05-01 RX ADMIN — DEXAMETHASONE SODIUM PHOSPHATE 4 MG: 4 MG/ML VIAL (ML) INJECTION at 15:02:00

## 2024-05-01 RX ADMIN — PROTAMINE SULFATE 50 MG: 10 INJECTION, SOLUTION INTRAVENOUS at 16:40:00

## 2024-05-01 RX ADMIN — EPHEDRINE SULFATE 10 MG: 50 INJECTION INTRAVENOUS at 15:41:00

## 2024-05-01 RX ADMIN — SODIUM CHLORIDE: 9 INJECTION, SOLUTION INTRAVENOUS at 14:50:00

## 2024-05-01 RX ADMIN — GLYCOPYRROLATE 0.8 MG: 0.2 INJECTION, SOLUTION INTRAMUSCULAR; INTRAVENOUS at 16:45:00

## 2024-05-01 RX ADMIN — PHENYLEPHRINE HCL 100 MCG: 10 MG/ML VIAL (ML) INJECTION at 16:25:00

## 2024-05-01 RX ADMIN — NEOSTIGMINE METHYLSULFATE 5 MG: 1 INJECTION, SOLUTION INTRAVENOUS at 16:45:00

## 2024-05-01 RX ADMIN — HEPARIN SODIUM 12000 UNITS: 1000 INJECTION, SOLUTION INTRAVENOUS; SUBCUTANEOUS at 15:27:00

## 2024-05-01 RX ADMIN — SODIUM CHLORIDE: 9 INJECTION, SOLUTION INTRAVENOUS at 15:55:00

## 2024-05-01 RX ADMIN — ONDANSETRON 4 MG: 2 INJECTION INTRAMUSCULAR; INTRAVENOUS at 15:02:00

## 2024-05-01 RX ADMIN — SODIUM CHLORIDE, SODIUM LACTATE, POTASSIUM CHLORIDE, CALCIUM CHLORIDE: 600; 310; 30; 20 INJECTION, SOLUTION INTRAVENOUS at 17:08:00

## 2024-05-01 RX ADMIN — ROCURONIUM BROMIDE 40 MG: 10 INJECTION, SOLUTION INTRAVENOUS at 15:02:00

## 2024-05-01 RX ADMIN — HEPARIN SODIUM 5000 UNITS: 1000 INJECTION, SOLUTION INTRAVENOUS; SUBCUTANEOUS at 15:53:00

## 2024-05-01 NOTE — ANESTHESIA PREPROCEDURE EVALUATION
PRE-OP EVALUATION    Patient Name: Kelly Bettencourt    Admit Diagnosis: A-fib (HCC) [I48.91]    Pre-op Diagnosis: * No surgery found *        Anesthesia Procedure: CATH EP    * Surgery not found *    Pre-op vitals reviewed.        Body mass index is 26.96 kg/m².    Current medications reviewed.  Hospital Medications:  No current facility-administered medications on file as of 5/1/2024.       Outpatient Medications:   (Not in a hospital admission)      Allergies: Avelox [moxifloxacin hydrochloride], Cephalexin, Ciprofloxacin, Morphine, Vantin, Cefpodoxime, Lipitor [atorvastatin], and Nifedipine      Anesthesia Evaluation        Anesthetic Complications           GI/Hepatic/Renal      (+) GERD                           Cardiovascular      ECG reviewed.  Exercise tolerance: poor     MET: <=4      (+) hypertension   (+) hyperlipidemia          (+) valvular problems/murmurs and MVP    (+) dysrhythmias and atrial fibrillation                  Endo/Other    Negative endo/other ROS.                              Pulmonary    Negative pulmonary ROS.                       Neuro/Psych          (+) CVA and residual deficits                   Ischemic CVA: bilateral UE and LE weakness  Last dose eliquis yesterday.  Scoliosis    8/2023 ECHO findings:  FINDINGS:   ·Overall normal LVSF with an estimated LVEF 60-65% and without wall motion abnormalities. Chamber sizes were within normal limits. Left atrial enlargement.  ·Mild prolapse of the mitral valve with mild mitral insufficiency.  ·There was no evidence for reversal of flow in the pulmonary veins.   ·There was no intracardiac shunting or evidence of ASD or PFO by color flow interrogation and agitated saline bubble study.  ·The aorta had no significant atherosclerotic plaque but no evidence for mobile atheromata or thrombus.  ·No pericardial effusion.  ·28 mm Amulet device in left atrial appendage (ISRA[place]) appears well seated and no excessive mobility  ·Color flow interrogation  revealed no kalia-device flow  ·There was no evidence for intracardiac mass or thrombus over the Amulet device          Past Surgical History:   Procedure Laterality Date    Appendectomy      Cataract      Cholecystectomy      Knee replacement surgery Left     Knee surgery  8-29-12    LEFT TKA - Dr. Johnson    Repair retinal detach, cplx      Tonsillectomy       Social History     Socioeconomic History    Marital status:    Tobacco Use    Smoking status: Never    Smokeless tobacco: Never   Vaping Use    Vaping status: Never Used   Substance and Sexual Activity    Alcohol use: No    Drug use: Never     History   Drug Use Unknown     Available pre-op labs reviewed.     Lab Results   Component Value Date     04/10/2024    K 4.1 04/10/2024     04/10/2024    CO2 28.0 04/10/2024    BUN 24 (H) 04/10/2024    CREATSERUM 1.14 (H) 04/10/2024     (H) 04/10/2024    CA 9.4 04/10/2024            Airway      Mallampati: II  Mouth opening: >3 FB  TM distance: 4 - 6 cm  Neck ROM: full Cardiovascular    Cardiovascular exam normal.         Dental             Pulmonary    Pulmonary exam normal.                 Other findings  Dentition grossly normal.            ASA: 3   Plan: general  NPO status verified and patient meets guidelines.        Comment: Invasive monitoring and additional PIV access r/b/a d/w patient.  Plan/risks discussed with: patient                Present on Admission:  **None**

## 2024-05-01 NOTE — ANESTHESIA POSTPROCEDURE EVALUATION
1 20 23 TRACE EDEMA 11 WKS - NO PROGRESSION - EYLEA AND EXTEND. [facility]    Kelly Bettencourt Patient Status:  Outpatient   Age/Gender 84 year old female MRN [id number]   Location [facility] POST ANESTHESIA CARE UNIT Attending Arnol Macedo MD   Hosp Day # 0 PCP Oliva Lai MD       Anesthesia Post-op Note        Procedure Summary       Date: 05/01/24 Room / Location: [facility] Interventional Suites    Anesthesia Start: 1450 Anesthesia Stop: 1711    Procedure: CATH EP Diagnosis:       A-fib (HCC)      A-fib (HCC)    Scheduled Providers:  Anesthesiologist: Wilberto Lester MD    Anesthesia Type: general ASA Status: 3            Anesthesia Type: general    Vitals Value Taken Time   /74 05/01/24 1713   Temp 97.5 05/01/24 1713   Pulse 59 05/01/24 1713   Resp 18 05/01/24 1713   SpO2 95 05/01/24 1713       Patient Location: PACU    Anesthesia Type: general    Airway Patency: patent and extubated    Postop Pain Control: adequate    Mental Status: mildly sedated but able to meaningfully participate in the post-anesthesia evaluation    Nausea/Vomiting: none    Cardiopulmonary/Hydration status: stable euvolemic    Complications: no apparent anesthesia related complications    Postop vital signs: stable    Dental Exam: Unchanged from Preop    Patient to be discharged from PACU when criteria met.

## 2024-05-01 NOTE — PROCEDURES
Procedure Note    Kelly Bettencourt Location: Cath Lab   [facility] [id number] MRN [id number]   Admission Date (Not on file)  Operation Date 05/01/24    Attending Physician Arnol Macedo MD Operating Physician Arnol Macedo MD     Pre-Operative Diagnosis: Atrial fibrillation    Post-Operative Diagnosis: Same as above    Procedure Performed: EP & ABLATE SUPRAVENT ARRHYTHMIA/Atrial Fibrillation  1. Comprehensive electrophysiology study.   2. Coronary sinus catheter placement to record left atrial electrograms and pace the left atrium. .    3. Three-dimensional intracardiac mapping.   4. Intracardiac echocardiography (ICE).   5. Transseptal catheterization.   6. Radiofrequency Pulmonary Vein Isolation ablation for atrial fibrillation.   7. Venous closure with Vascade  8. Secondary Arrhythmia with distinct mechanism-cavotricuspid isthmus dependent flutter  9. Secondary lines for Atrial Fibrillation: None    Indication: Symptomatic paroxysmal atrial fibrillation.     EBL: Minimal    Summary of Case: The patient was brought to the EP lab in a fasting and   nonsedated state after providing informed consent. . The right and left groins were prepped   and draped in a sterile fashion.  Ultrasound-guided access was obtained.. Catheters were placed in the appropriate   Positions (HRA, CS, RV, HB) under ICE and 3D mapping guidance.   Sheaths:  8 [place]-Short sheath upsized for transeptal sheath and then the Vizigo sheath. Through this, we inserted an Octarray and then the Ablation catheter  10 fr: - Intracardiac Echo  7 fr: Decapolar catheter to the coronary sinus    An intracardiac echo catheter was placed in the mid right atrium and the  interatrial septum was clearly visualized. A Stand Offer versacross SL-1 sheath was then advanced  in the right femoral vein over a long  guidewire to the SVC-RA junction.  The sheath, dilator  and needle were withdrawn in the 5-6 oclock position until tenting was clearly   visualized within the  interatrial septum. The RF wire was advanced   through the fossa ovalis, this was confirmed  by ICE. The dilator   and sheath were gently advanced over the [place] circular wire into the left atrium visualized by ICE.  . A heparin bolus was given prior to transeptal access and after femoral vein access   to maintain an ACT level of at least 300-350 seconds.    3-D Mapping:  A three-dimensional electroanatomic map was made using an Octarray multielectrode catheter.  From this mapping we found that patient had 4 pulmonary veins with a common ostium.  Normal voltage in the posterior wall    RF- Ablation:  We utilized a high power short duration strategy with 50 W lesions, with contact force goals of > 10 grams and 10 ohm impedance drops.  In the anterior wall we used an index of:500-550.  On the posterior wall we used Qmode plus 90 W/4 second lesions with active esophageal cooling and slightly wider interspacing of lesions.  Cavotricuspid isthmus ablation was performed at 40 W with 10 ohm impedance drops and bidirectional block  An esophageal temperature probe/ Active Attune Cooling balloon was placed to monitor temperatures during RF energy delivery.    A High ventilation rate/low Tidal volume and atrial pacing was used during RF delivery to facilitate cathter stability.    All 4 pulmonary veins were isolated as demonstrated by entrance and exit block. This was confirmed by either differential pacing with the coronary sinus catheter (placed in the CS to record electrograms and pace the left atrium) and the circular pulmonary vein catheter. Catheter positions and cardiac anatomy was visualized by 3D mapping.   Protamine was given after a test dose, the sheaths were pulled and hemostasis was maintained with Vascade    ELECTROPHYSIOLOGY STUDY FINDINGS:   Sinus rhythm, cycle length 1452 ms,   SC 220ms,  ms, QT 469ms.   AH 140ms, HV 45ms.     CONCLUSIONS:   1. Sinus node function normal.   2. Atrioventricular node  function normal.   3. His-Purkinje function normal.   4. Pulmonary vein isolation was achieved with radiofrequency.    5. There was no pericardial effusion observed at the conclusion of   the procedure, confirmed by intracardiac echocardiography.  6.  Additional lesions for AF: None  7.  Additional arrhythmias with a distinct mechaniosm: Cavotricuspid isthmus ablation for typical right atrial flutter  8. Venous closure- Vascade    Complications:  None      Plan:    1) Continue oral anticoagulation Eliquis, uninterrupted.-Patient will need a new prescription for Eliquis 5 twice daily  2) Continue all meds however we will decrease amiodarone to half a tablet a day (100 mg)-patient should not get a new prescription they should just cut the pills in half  3) Bedrest for 2 hours  4) Pantoprazole 40 mg daily ( new prescription) - 1 month only  5) Follow up with me in 1 month  6) No TTE needed before discharge.               Arnol Macedo MD    Cardiac Electrophysiololgy  [facility]  5/1/2024

## 2024-05-01 NOTE — ANESTHESIA PROCEDURE NOTES
Arterial Line    Date/Time: 5/1/2024 3:05 PM    Performed by: Wilberto Lester MD  Authorized by: Wilberto Lester MD    General Information and Staff    Procedure Start:  5/1/2024 3:05 PM  Procedure End:  5/1/2024 3:08 PM  Anesthesiologist:  Wilberto Lester MD  Performed By:  Anesthesiologist  Patient Location:  OR  Indication: continuous blood pressure monitoring and blood sampling needed    Site Identification: real time ultrasound guided, surface landmarks and image stored and retrievable    Preanesthetic Checklist: 2 patient identifiers, IV checked, risks and benefits discussed, monitors and equipment checked, pre-op evaluation, timeout performed, anesthesia consent and sterile technique used    Procedure Details    Catheter Size:  20 G  Catheter Length:  1 and 3/4 inch  Catheter Type:  Arrow  Seldinger Technique?: Yes    Laterality:  Left  Site:  Radial artery  Site Prep: chlorhexidine    Line Secured:  Wrist Brace, tape and Tegaderm    Assessment    Events: patient tolerated procedure well with no complications      Medications  5/1/2024 3:05 PM      Additional Comments    Left radial arterial catheter placed under ultrasound guidance without complications; +good waveform and correlation with NIBP. Taped and secured.  Following invasive pressures.

## 2024-05-01 NOTE — ANESTHESIA PROCEDURE NOTES
Airway  Date/Time: 5/1/2024 3:02 PM  Urgency: elective    Airway not difficult    General Information and Staff    Patient location during procedure: OR  Anesthesiologist: Wilberto Lester MD  Performed: anesthesiologist   Performed by: Wilberto Lester MD  Authorized by: Wilberto Lester MD      Indications and Patient Condition  Indications for airway management: anesthesia  Spontaneous Ventilation: absent  Sedation level: deep  Preoxygenated: yes  Patient position: sniffing  Mask difficulty assessment: 2 - vent by mask + OA or adjuvant +/- NMBA    Final Airway Details  Final airway type: endotracheal airway      Successful airway: ETT  Cuffed: yes   Successful intubation technique: Video laryngoscopy  Facilitating devices/methods: intubating stylet  Endotracheal tube insertion site: oral  Blade: GlideScope  Blade size: #3  ETT size (mm): 7.0    Cormack-Lehane Classification: grade IIA - partial view of glottis  Placement verified by: capnometry   Cuff volume (mL): 10  Measured from: lips  ETT to lips (cm): 21  Number of attempts at approach: 2  Ventilation between attempts: BVM  Number of other approaches attempted: 1    Other Attempts  Unsuccessful attempted airways: endotracheal tube  Unsuccessful attempted endotracheal techniques: direct laryngoscopy    Additional Comments  PreO2.  IV induction as noted.  Eyes taped.  Easy mask ventilation.  DL x 1 with MAC 3, grade 3 view only, attempt stopped and continued to easily mask ventilate.  Glidescope #3 used, vocal cords visualized, atraumatic oral intubation ETT 7.0, +ETCO2, +BBS.  Taped and secured at 21 cm.

## 2024-05-01 NOTE — H&P
[place]  Pre Procedure History and Physical    Kelly Bettencourt Patient Status:  Outpatient    1940 MRN [id number]   Location [facility] INTERVENTIONAL SUITES Attending Arnol Macedo MD   Hosp Day # 0 PCP Oliva Lai MD     Consults      History of Present Illness:  Kelly Bettencourt is a a(n) 84 year old female here for RF PVI/RF CTI      Prior H/P Reviewed with     CT scan- Amulet device well seated. No thrombus.    History:  Past Medical History:    Allergy to mold spores    Arrhythmia    AFIB    Arthritis    Back problem    Dry eyes    Dust allergy    Esophageal reflux    Fibromyalgia    Neurologist     High blood cholesterol    High blood pressure    Hypertension    Cardiologist,  Ali - annually    Hypothyroidism    IBS (irritable bowel syndrome)    Osteopenia    Scoliosis    No surgical intervention, taking physical therapy     Stroke (HCC)    Thyroid disease    Endocrinologist followed      Past Surgical History:   Procedure Laterality Date    Appendectomy      Cataract      Cholecystectomy      Knee replacement surgery Left     Knee surgery  12    LEFT TKA - Dr. Johnson    Repair retinal detach, cplx      Tonsillectomy       Family History   Problem Relation Age of Onset    Hypertension Father     Cancer Father     Hypertension Mother     Heart Disease Mother     Cancer Mother     Heart Disease Maternal Grandmother     Hypertension Maternal Grandmother       reports that she has never smoked. She has never used smokeless tobacco. She reports that she does not drink alcohol and does not use drugs.    Allergies:  Allergies   Allergen Reactions    Avelox [Moxifloxacin Hydrochloride]      Weakness      Cephalexin RASH    Ciprofloxacin OTHER (SEE COMMENTS)     tendonitis    Morphine ITCHING    Vantin RASH    Cefpodoxime OTHER (SEE COMMENTS)    Lipitor [Atorvastatin] RASH    Nifedipine FATIGUE     Oral       Medications:  No current facility-administered medications for this  encounter.    OBJECTIVE      Physical Exam:  Physical Exam   Height 5' 5\" (1.651 m), weight 162 lb (73.5 kg).  No data recorded.    Wt Readings from Last 3 Encounters:   04/29/24 162 lb (73.5 kg)   07/28/23 158 lb (71.7 kg)   05/15/23 152 lb 1.9 oz (69 kg)       NAD  PERRLA/EOMI  Neck veins not elevated  Carotids- no bruits  CTA bilaterally  Cardiac- RRR  Abdomen- Soft,Nontender, normal BS  Extremities- pulses normal  Edema- none  Mood /Affect Congruent  Skin- no lesions          Results:   No results for input(s): \"GLU\", \"BUN\", \"CREATSERUM\", \"GFRAA\", \"GFRNAA\", \"EGFRCR\", \"CA\", \"NA\", \"K\", \"CL\", \"CO2\" in the last 168 hours.  No results for input(s): \"RBC\", \"HGB\", \"HCT\", \"MCV\", \"MCH\", \"MCHC\", \"RDW\", \"NEPRELIM\", \"WBC\", \"PLT\" in the last 168 hours.      [unfilled]  No results for input(s): \"BNP\" in the last 168 hours.  Lab Results   Component Value Date    INR 1.4 (A) 05/23/2023    INR 2.23 (H) 05/18/2023    INR 2.50 (H) 05/01/2023     Lab Results   Component Value Date    TROP <0.045 12/05/2021         No results found.       Assessment and Plan    Patient Active Problem List   Diagnosis    Degeneration of lumbar or lumbosacral intervertebral disc    Osteoarthrosis involving lower leg    Thoracic kyphosis    Scoliosis    Lumbago    Internal derangement of right knee    Impaired fasting glucose    Dyshormonogenic goiter    Mixed hyperlipidemia    Essential hypertension    Osteoporosis    IBS (irritable bowel syndrome)    Primary osteoarthritis of right knee    Well woman exam with routine gynecological exam    Breast cancer screening    Arthralgia of right temporomandibular joint    Gastroesophageal reflux disease with esophagitis    Vertigo    External hemorrhoid, bleeding    Mitral valve prolapse    Nonrheumatic mitral valve regurgitation    PVC (premature ventricular contraction)    Vitreous opacities    Spinal enthesopathy, cervical region (HCC)    Atherosclerosis of native artery of both lower extremities, with  unspecified presence of clinical manifestation (HCC)    Nodular thyroid disease    Accelerated hypertension    Acute ischemic stroke (HCC)    Acute right hemiparesis (HCC)    Atrial fibrillation, unspecified type (HCC)    Hypokalemia    Azotemia    Hyperglycemia    Atrial fibrillation with rapid ventricular response (HCC)    Weakness generalized       Consent: Risks, Benefits and alternatives discussed in clinic. Reverified on the day of the procedure    Procedure Planned: RF PVI/RF CTI    Sedation Plan: GETA/Attune    Discharge Plan: same day      Arnol Macedo MD  Cardiac Electrophysiology  [facility]  5/1/2024  7:53 AM

## 2024-05-01 NOTE — DISCHARGE INSTRUCTIONS
HOME CARE INSTRUCTIONS FOLLOWING CARDIAC ABLATION (RFA) OR ELECTROPHYSIOLOGIC STUDY (EPS)    Activity:    - DO NOT drive after the procedure. You may resume driving late the following day according to the nurse or physician's instructions.  - Plan on resting and relaxing tonight and tomorrow. It will be normal to tire easily for the first few days, depending on the length of the procedure and the amount of sedation you received.   - DO NOT lift anything over 10 pounds for the next 24 hours.  - Avoid sexual activity for the next 24 hours.  - Avoid repeated stair use and excessive walking for the next 24 hours.   - Avoid drinking alcohol for the next 24 hours.  - Resume your normal activity after 48 hours, or as instructed by your physician.      What is Normal:    - You may feel extra heart beats. If these beats come too often or you feel an episode of multiple fast heartbeats, notify your physician.  - The procedure site may appear bruised or discolored.  - There may be a small amount of drainage on the bandage  - There may be mild tenderness to the procedure site when touched, which is common.       Special Instructions:    - The bandage is to remain in place for 24 hours. Keep the bandage clean and dry. Do not submerge the site for 72 hours (no tub, baths or pools).  - After 24 hours you must remove the bandage. Wash the procedure site gently with soap and water. If you choose to wear a bandaid for a few days, make sure it remains clean and dry and that it is changed daily.  - The day after the procedure you may shower after you remove your dressing (but not baths).      When you should NOTIFY YOUR PHYSICIAN:    - If you have shortness of breath or a persistent cough  - If you have chest pain (angina)  - If you have persistent pain at the procedure site  - If you experience a fever with a temperature >101 degrees, chills, infection (redness, swelling, thick yellow drainage, or a foul odor from procedure  site)      Other:    - You may resume your present diet, unless otherwise directed by your physician  - You may resume all of your medications as prescribed, unless otherwise directed by your physician. A list of your medications was provided to you at discharge.

## 2024-05-01 NOTE — PROGRESS NOTES
Pt post ablation from PACU. Pt awake, vss. Right femoral venous access site is soft, drsg is saturated. Drsg removed, active oozing from site. Manual pressure held, quick clot new drsg applied. Will re-asses.     After an hour more of lying flat, right fem site still oozing some, manual pressure held and another new drsg applied with quick clot. Will re-assess. Dr. Macedo notified, advised just to continue to watch. (1930)    After sitting up in bed, right fem site seems to have stopped bleeding. Pt up to walk, after returning to bed, right fem site still continues to bleed. Manual pressure held.  Femstop applied, report given to rula. Pt will transfer to 2611. Dr. Macedo notifed via text with no response at this time. (2040)

## 2024-05-02 VITALS
BODY MASS INDEX: 26.99 KG/M2 | HEIGHT: 65 IN | WEIGHT: 162 LBS | HEART RATE: 62 BPM | DIASTOLIC BLOOD PRESSURE: 53 MMHG | OXYGEN SATURATION: 93 % | SYSTOLIC BLOOD PRESSURE: 125 MMHG | TEMPERATURE: 98 F | RESPIRATION RATE: 17 BRPM

## 2024-05-02 RX ORDER — METOPROLOL SUCCINATE 25 MG/1
25 TABLET, EXTENDED RELEASE ORAL
Status: DISCONTINUED | OUTPATIENT
Start: 2024-05-02 | End: 2024-05-02

## 2024-05-02 RX ORDER — FUROSEMIDE 20 MG/1
20 TABLET ORAL DAILY
Status: DISCONTINUED | OUTPATIENT
Start: 2024-05-02 | End: 2024-05-02

## 2024-05-02 RX ORDER — ROSUVASTATIN CALCIUM 10 MG/1
10 TABLET, COATED ORAL NIGHTLY
Status: DISCONTINUED | OUTPATIENT
Start: 2024-05-02 | End: 2024-05-02

## 2024-05-02 RX ORDER — ERGOCALCIFEROL 1.25 MG/1
50000 CAPSULE ORAL WEEKLY
Status: DISCONTINUED | OUTPATIENT
Start: 2024-05-06 | End: 2024-05-02

## 2024-05-02 RX ORDER — PANTOPRAZOLE SODIUM 40 MG/1
40 TABLET, DELAYED RELEASE ORAL
Qty: 30 TABLET | Refills: 0 | Status: SHIPPED | OUTPATIENT
Start: 2024-05-02

## 2024-05-02 RX ORDER — PANTOPRAZOLE SODIUM 40 MG/1
40 TABLET, DELAYED RELEASE ORAL
Status: DISCONTINUED | OUTPATIENT
Start: 2024-05-02 | End: 2024-05-02

## 2024-05-02 RX ORDER — METHIMAZOLE 5 MG/1
7.5 TABLET ORAL DAILY
Status: DISCONTINUED | OUTPATIENT
Start: 2024-05-02 | End: 2024-05-02

## 2024-05-02 RX ORDER — AMIODARONE HYDROCHLORIDE 100 MG/1
100 TABLET ORAL DAILY
Status: DISCONTINUED | OUTPATIENT
Start: 2024-05-02 | End: 2024-05-02

## 2024-05-02 RX ORDER — ASPIRIN 81 MG/1
81 TABLET ORAL DAILY
Status: DISCONTINUED | OUTPATIENT
Start: 2024-05-02 | End: 2024-05-02

## 2024-05-02 RX ORDER — DULOXETIN HYDROCHLORIDE 30 MG/1
60 CAPSULE, DELAYED RELEASE ORAL DAILY
Status: DISCONTINUED | OUTPATIENT
Start: 2024-05-02 | End: 2024-05-02

## 2024-05-02 RX ORDER — EPLERENONE 25 MG/1
12.5 TABLET, FILM COATED ORAL DAILY
Status: DISCONTINUED | OUTPATIENT
Start: 2024-05-02 | End: 2024-05-02

## 2024-05-02 RX ADMIN — AMIODARONE HYDROCHLORIDE 100 MG: 100 TABLET ORAL at 09:30:00

## 2024-05-02 RX ADMIN — DULOXETIN HYDROCHLORIDE 60 MG: 30 CAPSULE, DELAYED RELEASE ORAL at 10:02:00

## 2024-05-02 RX ADMIN — ASPIRIN 81 MG: 81 TABLET ORAL at 10:02:00

## 2024-05-02 RX ADMIN — METHIMAZOLE 7.5 MG: 5 TABLET ORAL at 09:30:00

## 2024-05-02 RX ADMIN — EPLERENONE 12.5 MG: 25 TABLET, FILM COATED ORAL at 09:30:00

## 2024-05-02 RX ADMIN — FUROSEMIDE 20 MG: 20 TABLET ORAL at 09:30:00

## 2024-05-02 RX ADMIN — PANTOPRAZOLE SODIUM 40 MG: 40 TABLET, DELAYED RELEASE ORAL at 09:45:00

## 2024-05-02 NOTE — PLAN OF CARE
NURSING DISCHARGE NOTE    Discharged Home via Wheelchair.  Accompanied by Support staff  Belongings Taken by patient/family.

## 2024-05-02 NOTE — PLAN OF CARE
S/p RF Ablation from yesterday  Right groin stable ,clean,dry and intact,no hematoma,bruseing noted  Pt remains Sinus Junior HR 57-60's  Denies any pain  Ambulated in the halls,tolerated fine  Discharge today.    Problem: CARDIOVASCULAR - ADULT  Goal: Maintains optimal cardiac output and hemodynamic stability  Description: INTERVENTIONS:  - Monitor vital signs, rhythm, and trends  - Monitor for bleeding, hypotension and signs of decreased cardiac output  - Evaluate effectiveness of vasoactive medications to optimize hemodynamic stability  - Monitor arterial and/or venous puncture sites for bleeding and/or hematoma  - Assess quality of pulses, skin color and temperature  - Assess for signs of decreased coronary artery perfusion - ex. Angina  - Evaluate fluid balance, assess for edema, trend weights  Outcome: Progressing  Goal: Absence of cardiac arrhythmias or at baseline  Description: INTERVENTIONS:  - Continuous cardiac monitoring, monitor vital signs, obtain 12 lead EKG if indicated  - Evaluate effectiveness of antiarrhythmic and heart rate control medications as ordered  - Initiate emergency measures for life threatening arrhythmias  - Monitor electrolytes and administer replacement therapy as ordered  Outcome: Progressing     Problem: SKIN/TISSUE INTEGRITY - ADULT  Goal: Incision(s), wounds(s) or drain site(s) healing without S/S of infection  Description: INTERVENTIONS:  - Assess and document risk factors for pressure ulcer development  - Assess and document skin integrity  - Assess and document dressing/incision, wound bed, drain sites and surrounding tissue  - Implement wound care per orders  - Initiate isolation precautions as appropriate  - Initiate Pressure Ulcer prevention bundle as indicated  Outcome: Progressing

## 2024-05-02 NOTE — PROGRESS NOTES
Progress Note  Kelly Bettencourt Patient Status:  Observation    1940 MRN [id number]   [facility] 2NE-A Attending Arnol Macedo MD   Hosp Day # 0 PCP Oliva Lai MD     Subjective:  Denies chest pain, palpitations or SOB. States groin oozing stopped overnight.    Objective:  /71 (BP Location: Right arm)   Pulse 54   Temp 98.3 °F (36.8 °C) (Oral)   Resp 17   Ht 5' 5\" (1.651 m)   Wt 162 lb (73.5 kg)   SpO2 92%   BMI 26.96 kg/m²     Telemetry: NSR 60's-70's this am    Intake/Output:    Intake/Output Summary (Last 24 hours) at 2024 0933  Last data filed at 2024 0700  Gross per 24 hour   Intake 1400 ml   Output 600 ml   Net 800 ml       Last 3 Weights   24 1158 162 lb (73.5 kg)   23 0928 158 lb (71.7 kg)   05/15/23 1247 152 lb 1.9 oz (69 kg)       Labs:  No results for input(s): \"GLU\", \"BUN\", \"CREATSERUM\", \"GFRAA\", \"GFRNAA\", \"EGFRCR\", \"CA\", \"NA\", \"K\", \"CL\", \"CO2\" in the last 168 hours.  No results for input(s): \"RBC\", \"HGB\", \"HCT\", \"MCV\", \"MCH\", \"MCHC\", \"RDW\", \"NEPRELIM\", \"WBC\", \"PLT\" in the last 168 hours.      No results for input(s): \"TROP\", \"TROPHS\", \"CK\" in the last 168 hours.    Diagnostics:  No results found.   Review of Systems   Constitutional: Negative.   Cardiovascular: Negative.    Respiratory: Negative.         Physical Exam:    Gen: alert, oriented x 3, NAD  Heent: pupils equal, reactive. Mucous membranes moist.   Neck: no jvd  Cardiac: regular rate and rhythm, normal S1,S2, no murmur, clicks, rub or gallop  Lungs: CTA  Abd: soft, NT/ND +bs  Ext: no edema  Skin: Warm, dry, R groin site with small amt of bruising, soft, CDI  Neuro: No focal deficits      Medications:     aspirin  81 mg Oral Daily    DULoxetine  60 mg Oral Daily    eplerenone  12.5 mg Oral Daily    furosemide  20 mg Oral Daily    ergocalciferol  50,000 Units Oral Weekly    methIMAzole  7.5 mg Oral Daily    metoprolol succinate ER  25 mg Oral Daily Beta Blocker    rosuvastatin  10 mg  Oral Nightly    apixaban  5 mg Oral BID    amiodarone  100 mg Oral Daily    gabapentin  100 mg Oral Nightly       Assessment:  Atrial Fibrillation s/p EP Study, RF PVI/CTI Ablation   Remains NSR/SB this am   Intracardiac echo w/o effusion  Venous Closure - [place]; groin oozing post-procedure, now improved  On amiodarone, metoprolol, eliquis  S/P Amulet device 5/2023  HTN - controlled  HLD - statin   Hx CVA - on asa, statin    Plan:  Continue Eliquis  Decrease usual amiodarone to 100mg po daily  Protonix 40mg po daily for 1 month  Continue home toprol  F/U with Dr Macedo in [facility] office in 1 month  If patient ambulates without difficulty and no bleeding from groin site, ok to discharge home from cardiology standpoint.     Plan of care discussed with patient, RN.    Jacqueline Tavares, APRN  5/2/2024  9:33 AM  [phone] [facility]  [phone] [facility]

## 2024-05-02 NOTE — PLAN OF CARE
Received patient at 2145.  Alert and oriented x 4. Tele Rhythm Sinus Junior, oxygen maintained on room iar. Breath sounds are clear. Pt arrived with femostop over R groin to stop oozing that started in PACU. Femostop was removed after 1.5 hr and quick clot was applied with minimal pressure. The dressing is clean and intact, pt remained on bedrest over night. Pedal pulses are weak and detectable with doppler only. Skin -post ablation site-has moderate bruising, it is soft with no signs of hematoma. Last bowel movement 04/30. Patient voiding with no issues. Patient reports no cardiac symptoms, No chest pain or shortness of breath. Bed is locked and in low position. Call light and personal items within reach. Reviewed plan of care and patient verbalizes understanding.         Problem: PAIN - ADULT  Goal: Verbalizes/displays adequate comfort level or patient's stated pain goal  Description: INTERVENTIONS:  - Encourage pt to monitor pain and request assistance  - Assess pain using appropriate pain scale  - Administer analgesics based on type and severity of pain and evaluate response  - Implement non-pharmacological measures as appropriate and evaluate response  - Consider cultural and social influences on pain and pain management  - Manage/alleviate anxiety  - Utilize distraction and/or relaxation techniques  - Monitor for opioid side effects  - Notify MD/LIP if interventions unsuccessful or patient reports new pain  - Anticipate increased pain with activity and pre-medicate as appropriate  5/2/2024 0636 by Ngozi Jordan RN  Outcome: Progressing  5/2/2024 0636 by Ngozi Jordan RN  Outcome: Progressing     Problem: RISK FOR INFECTION - ADULT  Goal: Absence of fever/infection during anticipated neutropenic period  Description: INTERVENTIONS  - Monitor WBC  - Administer growth factors as ordered  - Implement neutropenic guidelines  5/2/2024 0636 by Ngozi Jordan RN  Outcome:  Progressing  5/2/2024 0636 by Ngozi Jordan RN  Outcome: Progressing     Problem: SAFETY ADULT - FALL  Goal: Free from fall injury  Description: INTERVENTIONS:  - Assess pt frequently for physical needs  - Identify cognitive and physical deficits and behaviors that affect risk of falls.  - [place] fall precautions as indicated by assessment.  - Educate pt/family on patient safety including physical limitations  - Instruct pt to call for assistance with activity based on assessment  - Modify environment to reduce risk of injury  - Provide assistive devices as appropriate  - Consider OT/PT consult to assist with strengthening/mobility  - Encourage toileting schedule  5/2/2024 0636 by Ngozi Jordan RN  Outcome: Progressing  5/2/2024 0636 by Ngozi Jordan RN  Outcome: Progressing     Problem: DISCHARGE PLANNING  Goal: Discharge to home or other facility with appropriate resources  Description: INTERVENTIONS:  - Identify barriers to discharge w/pt and caregiver  - Include patient/family/discharge partner in discharge planning  - Arrange for needed discharge resources and transportation as appropriate  - Identify discharge learning needs (meds, wound care, etc)  - Arrange for interpreters to assist at discharge as needed  - Consider post-discharge preferences of patient/family/discharge partner  - Complete POLST form as appropriate  - Assess patient's ability to be responsible for managing their own health  - Refer to Case Management Department for coordinating discharge planning if the patient needs post-hospital services based on physician/LIP order or complex needs related to functional status, cognitive ability or social support system  5/2/2024 0636 by Ngozi Jordan RN  Outcome: Progressing  5/2/2024 0636 by Ngozi Jordan RN  Outcome: Progressing

## 2024-06-04 ENCOUNTER — TELEPHONE (OUTPATIENT)
Dept: CARDIOLOGY CLINIC | Facility: HOSPITAL | Age: 84
End: 2024-06-04

## 2024-06-04 NOTE — TELEPHONE ENCOUNTER
1 year post LAAO, no issues. Will discuss meds (blood thinners, remains on Eliquis) with MD at o.v. later this month.

## 2024-08-21 ENCOUNTER — HOSPITAL ENCOUNTER (OUTPATIENT)
Facility: HOSPITAL | Age: 84
Setting detail: OBSERVATION
Discharge: HOME | End: 2024-08-22
Attending: EMERGENCY MEDICINE | Admitting: HOSPITALIST
Payer: MEDICARE

## 2024-08-21 ENCOUNTER — APPOINTMENT (OUTPATIENT)
Dept: GENERAL RADIOLOGY | Facility: HOSPITAL | Age: 84
End: 2024-08-21
Payer: MEDICARE

## 2024-08-21 DIAGNOSIS — R07.9 CHEST PAIN, RULE OUT ACUTE MYOCARDIAL INFARCTION: Primary | ICD-10-CM

## 2024-08-21 LAB
ALBUMIN SERPL-MCNC: 4.4 G/DL (ref 3.2–4.8)
ALBUMIN/GLOB SERPL: 1.7 {RATIO} (ref 1–2)
ALP LIVER SERPL-CCNC: 76 U/L
ALT SERPL-CCNC: 15 U/L
ANION GAP SERPL CALC-SCNC: 4 MMOL/L (ref 0–18)
AST SERPL-CCNC: 19 U/L (ref ?–34)
BASOPHILS # BLD AUTO: 0.03 X10(3) UL (ref 0–0.2)
BASOPHILS NFR BLD AUTO: 0.4 %
BILIRUB SERPL-MCNC: 0.8 MG/DL (ref 0.2–1.1)
BUN BLD-MCNC: 22 MG/DL (ref 9–23)
CALCIUM BLD-MCNC: 10 MG/DL (ref 8.7–10.4)
CHLORIDE SERPL-SCNC: 104 MMOL/L (ref 98–112)
CO2 SERPL-SCNC: 32 MMOL/L (ref 21–32)
CREAT BLD-MCNC: 1.29 MG/DL
EGFRCR SERPLBLD CKD-EPI 2021: 41 ML/MIN/1.73M2 (ref 60–?)
EOSINOPHIL # BLD AUTO: 0.04 X10(3) UL (ref 0–0.7)
EOSINOPHIL NFR BLD AUTO: 0.6 %
ERYTHROCYTE [DISTWIDTH] IN BLOOD BY AUTOMATED COUNT: 14.1 %
GLOBULIN PLAS-MCNC: 2.6 G/DL (ref 2–3.5)
GLUCOSE BLD-MCNC: 118 MG/DL (ref 70–99)
HCT VFR BLD AUTO: 39.7 %
HGB BLD-MCNC: 13.1 G/DL
IMM GRANULOCYTES # BLD AUTO: 0.02 X10(3) UL (ref 0–1)
IMM GRANULOCYTES NFR BLD: 0.3 %
LYMPHOCYTES # BLD AUTO: 1.8 X10(3) UL (ref 1–4)
LYMPHOCYTES NFR BLD AUTO: 25.3 %
MCH RBC QN AUTO: 28.4 PG (ref 26–34)
MCHC RBC AUTO-ENTMCNC: 33 G/DL (ref 31–37)
MCV RBC AUTO: 85.9 FL
MONOCYTES # BLD AUTO: 0.7 X10(3) UL (ref 0.1–1)
MONOCYTES NFR BLD AUTO: 9.8 %
NEUTROPHILS # BLD AUTO: 4.52 X10 (3) UL (ref 1.5–7.7)
NEUTROPHILS # BLD AUTO: 4.52 X10(3) UL (ref 1.5–7.7)
NEUTROPHILS NFR BLD AUTO: 63.6 %
OSMOLALITY SERPL CALC.SUM OF ELEC: 294 MOSM/KG (ref 275–295)
PLATELET # BLD AUTO: 236 10(3)UL (ref 150–450)
POTASSIUM SERPL-SCNC: 3.6 MMOL/L (ref 3.5–5.1)
PROT SERPL-MCNC: 7 G/DL (ref 5.7–8.2)
RBC # BLD AUTO: 4.62 X10(6)UL
SODIUM SERPL-SCNC: 140 MMOL/L (ref 136–145)
TROPONIN I SERPL HS-MCNC: 13 NG/L
WBC # BLD AUTO: 7.1 X10(3) UL (ref 4–11)

## 2024-08-21 PROCEDURE — 85025 COMPLETE CBC W/AUTO DIFF WBC: CPT

## 2024-08-21 PROCEDURE — 85025 COMPLETE CBC W/AUTO DIFF WBC: CPT | Performed by: EMERGENCY MEDICINE

## 2024-08-21 PROCEDURE — 84484 ASSAY OF TROPONIN QUANT: CPT

## 2024-08-21 PROCEDURE — 84484 ASSAY OF TROPONIN QUANT: CPT | Performed by: EMERGENCY MEDICINE

## 2024-08-21 PROCEDURE — 99285 EMERGENCY DEPT VISIT HI MDM: CPT

## 2024-08-21 PROCEDURE — 80053 COMPREHEN METABOLIC PANEL: CPT | Performed by: EMERGENCY MEDICINE

## 2024-08-21 PROCEDURE — 93010 ELECTROCARDIOGRAM REPORT: CPT

## 2024-08-21 PROCEDURE — 36415 COLL VENOUS BLD VENIPUNCTURE: CPT

## 2024-08-21 PROCEDURE — 71045 X-RAY EXAM CHEST 1 VIEW: CPT | Performed by: EMERGENCY MEDICINE

## 2024-08-21 PROCEDURE — 93005 ELECTROCARDIOGRAM TRACING: CPT

## 2024-08-21 PROCEDURE — 80053 COMPREHEN METABOLIC PANEL: CPT

## 2024-08-21 NOTE — ED INITIAL ASSESSMENT (HPI)
Pt c/o chest pain since yesterday, went to see her PMD today and had an elevated troponin, told to come to the ER.

## 2024-08-22 ENCOUNTER — APPOINTMENT (OUTPATIENT)
Dept: CV DIAGNOSTICS | Facility: HOSPITAL | Age: 84
End: 2024-08-22
Attending: HOSPITALIST
Payer: MEDICARE

## 2024-08-22 VITALS
HEIGHT: 65 IN | TEMPERATURE: 97 F | SYSTOLIC BLOOD PRESSURE: 135 MMHG | RESPIRATION RATE: 16 BRPM | BODY MASS INDEX: 27.15 KG/M2 | WEIGHT: 162.94 LBS | DIASTOLIC BLOOD PRESSURE: 63 MMHG | OXYGEN SATURATION: 98 % | HEART RATE: 53 BPM

## 2024-08-22 LAB
ATRIAL RATE: 65 BPM
BILIRUB UR QL STRIP.AUTO: NEGATIVE
CLARITY UR REFRACT.AUTO: CLEAR
COLOR UR AUTO: YELLOW
GLUCOSE UR STRIP.AUTO-MCNC: NORMAL MG/DL
HYALINE CASTS #/AREA URNS AUTO: PRESENT /LPF
KETONES UR STRIP.AUTO-MCNC: NEGATIVE MG/DL
LEUKOCYTE ESTERASE UR QL STRIP.AUTO: 75
NITRITE UR QL STRIP.AUTO: NEGATIVE
P AXIS: 69 DEGREES
P-R INTERVAL: 178 MS
PH UR STRIP.AUTO: 6 [PH] (ref 5–8)
Q-T INTERVAL: 420 MS
QRS DURATION: 100 MS
QTC CALCULATION (BEZET): 436 MS
R AXIS: -36 DEGREES
RBC UR QL AUTO: NEGATIVE
SP GR UR STRIP.AUTO: 1.02 (ref 1–1.03)
T AXIS: 29 DEGREES
TROPONIN I SERPL HS-MCNC: 13 NG/L
TROPONIN I SERPL HS-MCNC: 16 NG/L
UROBILINOGEN UR STRIP.AUTO-MCNC: 3 MG/DL
VENTRICULAR RATE: 65 BPM

## 2024-08-22 PROCEDURE — 81001 URINALYSIS AUTO W/SCOPE: CPT | Performed by: HOSPITALIST

## 2024-08-22 PROCEDURE — 93306 TTE W/DOPPLER COMPLETE: CPT | Performed by: HOSPITALIST

## 2024-08-22 PROCEDURE — 84484 ASSAY OF TROPONIN QUANT: CPT | Performed by: HOSPITALIST

## 2024-08-22 PROCEDURE — B246ZZZ ULTRASONOGRAPHY OF RIGHT AND LEFT HEART: ICD-10-PCS | Performed by: INTERNAL MEDICINE

## 2024-08-22 PROCEDURE — 87086 URINE CULTURE/COLONY COUNT: CPT | Performed by: HOSPITALIST

## 2024-08-22 RX ORDER — MELATONIN
3 NIGHTLY PRN
Status: DISCONTINUED | OUTPATIENT
Start: 2024-08-22 | End: 2024-08-22

## 2024-08-22 RX ORDER — METOCLOPRAMIDE HYDROCHLORIDE 5 MG/ML
5 INJECTION INTRAMUSCULAR; INTRAVENOUS EVERY 8 HOURS PRN
Status: DISCONTINUED | OUTPATIENT
Start: 2024-08-22 | End: 2024-08-22

## 2024-08-22 RX ORDER — EPLERENONE 25 MG/1
12.5 TABLET, FILM COATED ORAL DAILY
Status: DISCONTINUED | OUTPATIENT
Start: 2024-08-22 | End: 2024-08-22

## 2024-08-22 RX ORDER — HYDROCODONE BITARTRATE AND ACETAMINOPHEN 5; 325 MG/1; MG/1
1 TABLET ORAL EVERY 4 HOURS PRN
Status: DISCONTINUED | OUTPATIENT
Start: 2024-08-22 | End: 2024-08-22

## 2024-08-22 RX ORDER — FUROSEMIDE 20 MG
20 TABLET ORAL DAILY
Status: DISCONTINUED | OUTPATIENT
Start: 2024-08-22 | End: 2024-08-22

## 2024-08-22 RX ORDER — ACETAMINOPHEN 500 MG
500 TABLET ORAL EVERY 4 HOURS PRN
Status: DISCONTINUED | OUTPATIENT
Start: 2024-08-22 | End: 2024-08-22

## 2024-08-22 RX ORDER — PANTOPRAZOLE SODIUM 40 MG/1
40 TABLET, DELAYED RELEASE ORAL
Status: DISCONTINUED | OUTPATIENT
Start: 2024-08-22 | End: 2024-08-22

## 2024-08-22 RX ORDER — POLYETHYLENE GLYCOL 3350 17 G/17G
17 POWDER, FOR SOLUTION ORAL DAILY PRN
Status: DISCONTINUED | OUTPATIENT
Start: 2024-08-22 | End: 2024-08-22

## 2024-08-22 RX ORDER — ROSUVASTATIN CALCIUM 10 MG/1
10 TABLET, COATED ORAL NIGHTLY
Status: DISCONTINUED | OUTPATIENT
Start: 2024-08-22 | End: 2024-08-22

## 2024-08-22 RX ORDER — ACETAMINOPHEN 325 MG/1
650 TABLET ORAL EVERY 4 HOURS PRN
Status: DISCONTINUED | OUTPATIENT
Start: 2024-08-22 | End: 2024-08-22

## 2024-08-22 RX ORDER — ENOXAPARIN SODIUM 100 MG/ML
40 INJECTION SUBCUTANEOUS DAILY
Status: DISCONTINUED | OUTPATIENT
Start: 2024-08-22 | End: 2024-08-22

## 2024-08-22 RX ORDER — GABAPENTIN 100 MG/1
100 CAPSULE ORAL NIGHTLY
Status: DISCONTINUED | OUTPATIENT
Start: 2024-08-22 | End: 2024-08-22

## 2024-08-22 RX ORDER — METOPROLOL SUCCINATE 25 MG/1
12.5 TABLET, EXTENDED RELEASE ORAL
Qty: 50 TABLET | Refills: 0 | Status: SHIPPED | OUTPATIENT
Start: 2024-08-22

## 2024-08-22 RX ORDER — METOPROLOL SUCCINATE 25 MG/1
25 TABLET, EXTENDED RELEASE ORAL
Status: DISCONTINUED | OUTPATIENT
Start: 2024-08-22 | End: 2024-08-22

## 2024-08-22 RX ORDER — OMEPRAZOLE 40 MG/1
40 CAPSULE, DELAYED RELEASE ORAL EVERY MORNING
COMMUNITY

## 2024-08-22 RX ORDER — METHIMAZOLE 10 MG/1
7.5 TABLET ORAL DAILY
Status: DISCONTINUED | OUTPATIENT
Start: 2024-08-22 | End: 2024-08-22

## 2024-08-22 RX ORDER — SENNOSIDES 8.6 MG
17.2 TABLET ORAL NIGHTLY PRN
Status: DISCONTINUED | OUTPATIENT
Start: 2024-08-22 | End: 2024-08-22

## 2024-08-22 RX ORDER — AMIODARONE HYDROCHLORIDE 100 MG/1
100 TABLET ORAL DAILY
Status: DISCONTINUED | OUTPATIENT
Start: 2024-08-22 | End: 2024-08-22

## 2024-08-22 RX ORDER — ASPIRIN 81 MG/1
81 TABLET ORAL DAILY
Status: DISCONTINUED | OUTPATIENT
Start: 2024-08-22 | End: 2024-08-22

## 2024-08-22 RX ORDER — HYDROCODONE BITARTRATE AND ACETAMINOPHEN 5; 325 MG/1; MG/1
2 TABLET ORAL EVERY 4 HOURS PRN
Status: DISCONTINUED | OUTPATIENT
Start: 2024-08-22 | End: 2024-08-22

## 2024-08-22 RX ORDER — BISACODYL 10 MG
10 SUPPOSITORY, RECTAL RECTAL
Status: DISCONTINUED | OUTPATIENT
Start: 2024-08-22 | End: 2024-08-22

## 2024-08-22 RX ORDER — DULOXETIN HYDROCHLORIDE 30 MG/1
60 CAPSULE, DELAYED RELEASE ORAL DAILY
Status: DISCONTINUED | OUTPATIENT
Start: 2024-08-22 | End: 2024-08-22

## 2024-08-22 RX ORDER — ONDANSETRON 2 MG/ML
4 INJECTION INTRAMUSCULAR; INTRAVENOUS EVERY 6 HOURS PRN
Status: DISCONTINUED | OUTPATIENT
Start: 2024-08-22 | End: 2024-08-22

## 2024-08-22 NOTE — H&P
[facility] Hospitalist H&P       CC:   Chief Complaint   Patient presents with    Chest Pain        PCP: Oliva Lai MD    History of Present Illness:  Patient is a 84-year-old female significant past medical history of irritable bowel syndrome, prediabetes, history of A-fib status post ablation times 2, hypertension, hyperlipidemia, history of CVA, GERD was sent by the [facility] clinic for elevated troponin.  Patient presented to the clinic for continuous belching, she was on her way to complete her antibiotics for UTI no symptoms of UTI at this time, her symptoms mostly substernal, mostly relieved with belching-troponin was checked which was mildly elevated and hence patient was directed to the emergency room.  Patient follows up with [facility] cardiology, no recent stress test or PCI      Vital signs stable, labs were remarkable for  EKG with sinus rhythm and occasional PVCs, incomplete right bundle branch block septal infarct nonspecific T wave abnormalities chest x-ray again was rather unremarkable, creatinine slightly elevated 1.29 troponin x 3 has been negative, CBC unremarkable    Echo has been pending cardiology evaluation yet to be done    PM  Past Medical History:    Allergy to mold spores    Arrhythmia    AFIB    Arthritis    Back problem    Cataract    Disorder of thyroid    Dry eyes    Dust allergy    Esophageal reflux    Fibromyalgia    Neurologist     Heart valve disease    High blood cholesterol    High blood pressure    History of blood transfusion    Hypertension    Cardiologist,  Ali - annually    Hypothyroidism    IBS (irritable bowel syndrome)    Incontinence    Muscle weakness    Osteopenia    Pneumonia due to organism    Prediabetes    Scoliosis    No surgical intervention, taking physical therapy     Stroke (HCC)    Thyroid disease    Endocrinologist followed     Visual impairment        [facility]  Past Surgical History:   Procedure Laterality Date    Appendectomy      Cataract      Cath ep  05/01/2024     Cholecystectomy      Knee replacement surgery Left     Knee surgery  8-29-12    LEFT TKA - Dr. Johnson    Ndsc ablation & rcnstj [facility] w/o bypass      Repair retinal detach, cplx      Tonsillectomy      Total knee replacement          ALL:  Allergies   Allergen Reactions    Avelox [Moxifloxacin Hydrochloride]      Weakness      Cephalexin RASH    Ciprofloxacin OTHER (SEE COMMENTS)     tendonitis    Morphine ITCHING    Vantin RASH    Cefpodoxime OTHER (SEE COMMENTS)    Lipitor [Atorvastatin] RASH    Nifedipine FATIGUE     Oral        Home Medications:  Outpatient Medications Marked as Taking for the 8/21/24 encounter (Hospital Encounter)   Medication Sig Dispense Refill    omeprazole 20 MG Oral Capsule Delayed Release Take 2 capsules (40 mg total) by mouth every morning.      amoxicillin clavulanate 875-125 MG Oral Tab Take 1 tablet by mouth 2 (two) times daily.      methIMAzole 5 MG Oral Tab Take 1.5 tablets (7.5 mg total) by mouth daily. 1 1/2 tablet      gabapentin 100 MG Oral Cap Take 1 capsule (100 mg total) by mouth nightly.      ergocalciferol 1.25 MG (21557 UT) Oral Cap Take 1 capsule (50,000 Units total) by mouth once a week. (Patient taking differently: Take 1 capsule (50,000 Units total) by mouth once a week. Takes on friday) 12 capsule 1    eplerenone 25 MG Oral Tab Take 0.5 tablets (12.5 mg total) by mouth daily. 45 tablet 1    metoprolol succinate 25 MG Oral Tablet 24 Hr Take 1 tablet (25 mg total) by mouth Daily Beta Blocker. (Patient taking differently: Take 0.5 tablets (12.5 mg total) by mouth Daily Beta Blocker.) 30 tablet 3    furosemide 20 MG Oral Tab Take 1 tablet (20 mg total) by mouth daily. 30 tablet 3    aspirin 81 MG Oral Tab EC Take 1 tablet (81 mg total) by mouth daily.      rosuvastatin 10 MG Oral Tab Take 1 tablet (10 mg total) by mouth nightly.      hydrocortisone 2.5 % Rectal Cream Apply to the affected area twice daily as needed with symptoms 28.35 g 2    B Complex Vitamins  (VITAMIN B COMPLEX OR) Take 1 tablet by mouth daily.      CYMBALTA 60 MG Oral Cap DR Particles Take 1 capsule (60 mg total) by mouth daily.           Soc Hx  Social History     Tobacco Use    Smoking status: Never    Smokeless tobacco: Never   Substance Use Topics    Alcohol use: No        Fam Hx  Family History   Problem Relation Age of Onset    Hypertension Father     Cancer Father     Hypertension Mother     Heart Disease Mother     Cancer Mother     Heart Disease Maternal Grandmother     Hypertension Maternal Grandmother        Review of Systems  General: Denies unintentional weight loss, fevers, or chills-negative except for above  HEENT: Denies vision loss or double vision, denies hearing loss  Cardiovascular: Denies chest pain, palpitations, peripheral edema  Pulmonary: Denies cough, shortness of breath, or wheezing  Gastrointestinal: Denies abdominal pain, melena, or hematochezia  Genitourinary: Denies urinary frequency, urgency, and dysuria  Neurologic: Denies numbness, headaches, focal weakness  Skin: Denies rashes, sores  Endocrine: Denies heat or cold intolerance, denies polydipsia  Hematologic: Denies abnormal bleeding or bruising     OBJECTIVE:  /63 (BP Location: Right arm)   Pulse 53   Temp 97.1 °F (36.2 °C) (Axillary)   Resp 16   Ht 5' 5\" (1.651 m)   Wt 162 lb 14.7 oz (73.9 kg)   SpO2 99%   BMI 27.11 kg/m²     BP Readings from Last 3 Encounters:   08/22/24 118/63   05/02/24 125/53   03/19/24 131/57     Wt Readings from Last 3 Encounters:   08/22/24 162 lb 14.7 oz (73.9 kg)   04/29/24 162 lb (73.5 kg)   07/28/23 158 lb (71.7 kg)       Wt Readings from Last 6 Encounters:   08/22/24 162 lb 14.7 oz (73.9 kg)   04/29/24 162 lb (73.5 kg)   07/28/23 158 lb (71.7 kg)   05/15/23 152 lb 1.9 oz (69 kg)   09/16/22 150 lb (68 kg)   07/12/22 149 lb (67.6 kg)     Gen: No acute distress, alert and oriented x 3  Pulm: Lungs clear bilaterally, good inspiratory effort   CV:  nL S1/S2  Abd: soft, NT/ND, no  hepatomegaly, +BS  MSK: moving all extremities, no edema  Neuro: no focal deficits  Skin: no rashes/lesions  Psych: normal mood/affect          Diagnostic Data:    CBC/Chem  Recent Labs   Lab 08/21/24 1859   WBC 7.1   HGB 13.1   MCV 85.9   .0       Recent Labs   Lab 08/21/24 1859      K 3.6      CO2 32.0   BUN 22   CREATSERUM 1.29*   *   CA 10.0       Recent Labs   Lab 08/21/24 1859   ALT 15   AST 19   ALB 4.4         Radiology: XR CHEST AP PORTABLE  (CPT=71045)    Result Date: 8/21/2024  PROCEDURE:  XR CHEST AP PORTABLE  (CPT=71045)  TECHNIQUE:  AP chest radiograph was obtained.  COMPARISON:  [facility] , XR, XR CHEST AP/PA (1 VIEW) (CPT=71045), 5/18/2023, 11:49 AM.  INDICATIONS:  epigastric/chest pain, elevated tropinin  PATIENT STATED HISTORY: (As transcribed by Technologist)  Patient complains of mid chest soreness x 2 days. Denies any other symptoms.    FINDINGS:  Low lung volumes limit assessment.  Heart size is within normal limits.  No pleural effusion or focal consolidation is seen.  If clinical symptoms persist then recommend follow-up imaging.            CONCLUSION:  See above.   LOCATION:  [place]      Dictated by (CST): Sergey Burnette MD on 8/21/2024 at 9:21 PM     Finalized by (CST): Sergey Burnette MD on 8/21/2024 at 9:22 PM              ASSESSMENT / PLAN:         Patient is a 84-year-old female significant past medical history of irritable bowel syndrome, prediabetes, history of A-fib status post ablation times 2, hypertension, hyperlipidemia, history of CVA, GERD was sent by the [facility] clinic for elevated troponin.     # Chest pain, belching  # Elevated troponin  -Symptoms likely less cardiac, more likely related to GERD  -Troponin x 3 every has been negative  -EKG reviewed, cardiology consulted, echo pending  -Further workup per cardiology, okay to discharge if cardiology has cleared patient  -Telemetry shows sinus bradycardia  -DC on Protonix    # A-fib status post ablation, now sinus  bradycardia  -Appears to be well-controlled,  -Continue monitor on telemetry, echo pending    # Irritable bowel syndrome  # Hypertension  -Resume home metoprolol, Lasix and eplerenone    # Recent UTI complete the course of Augmentin  -    # History of CVA resume aspirin, statin  -  # GERD  -PPI    Prophy:  DVT: Lovenox  Deconditioning prevention: PT OT    Dispo: admit to [facility] with telemetry however okay to discharge once cleared by cardiology and no further workup is needed    Outpatient records reviewed confirming patient's medical history and medications.     Further recommendations pending patient's clinical course.  [facility] hospitalist to continue to follow patient while in house    Time spent: greater than 95 minutes spent in d/w pt/family, coordination of care, and d/w staff.     Ciarra hester MD   Internal Medicine  [facility] Hospitalist  Pager: [phone]

## 2024-08-22 NOTE — ED PROVIDER NOTES
Patient Seen in: [facility] Emergency Department      History     Chief Complaint   Patient presents with    Chest Pain     Stated Complaint: epigastric/chest pain, elevated tropinin    Subjective:   HPI    84-year-old female presenting to the emergency department for chest pain.  Yesterday the patient had a heartburn feeling worse with movement she saw her doctor today had outpatient blood test which showed an elevated troponin she was directed to the emergency department she says her pain has since lessened.  She denies recent cough cold runny nose or any other exacerbating relieving factors associated symptoms.    Objective:   Past Medical History:    Allergy to mold spores    Arrhythmia    AFIB    Arthritis    Back problem    Dry eyes    Dust allergy    Esophageal reflux    Fibromyalgia    Neurologist     High blood cholesterol    High blood pressure    Hypertension    Cardiologist,  Ali - annually    Hypothyroidism    IBS (irritable bowel syndrome)    Osteopenia    Scoliosis    No surgical intervention, taking physical therapy     Stroke (HCC)    Thyroid disease    Endocrinologist followed               Past Surgical History:   Procedure Laterality Date    Appendectomy      Cataract      Cath ep  5/1/2024    Cholecystectomy      Knee replacement surgery Left     Knee surgery  8-29-12    LEFT TKA - Dr. Johnson    Repair retinal detach, cplx      Tonsillectomy                  Social History     Socioeconomic History    Marital status:    Tobacco Use    Smoking status: Never    Smokeless tobacco: Never   Vaping Use    Vaping status: Never Used   Substance and Sexual Activity    Alcohol use: No    Drug use: Never     Social Determinants of Health     Food Insecurity: No Food Insecurity (5/1/2024)    Food Insecurity     Food Insecurity: Never true   Transportation Needs: No Transportation Needs (5/1/2024)    Transportation Needs     Lack of Transportation: No   Physical Activity: Inactive  (1/15/2021)    Received from [employer] [employer]    Exercise Vital Sign     Days of Exercise per Week: 0 days     Minutes of Exercise per Session: 0 min   Housing Stability: Low Risk  (5/1/2024)    Housing Stability     Housing Instability: No              Review of Systems    Positive for stated Chief Complaint: Chest Pain    Other systems are as noted in HPI.  Constitutional and vital signs reviewed.      All other systems reviewed and negative except as noted above.    Physical Exam     ED Triage Vitals [08/21/24 1853]   BP (!) 168/80   Pulse 65   Resp 14   Temp 97.5 °F (36.4 °C)   Temp src Oral   SpO2 97 %   O2 Device None (Room air)       Current Vitals:   Vital Signs  BP: (!) 165/89  Pulse: 61  Resp: 18  Temp: 97.5 °F (36.4 °C)  Temp src: Oral  MAP (mmHg): (!) 111    Oxygen Therapy  SpO2: 97 %  O2 Device: None (Room air)            Physical Exam  Awake alert patient appears no distress HEENT exam is normal lungs are clear cardiovascular exam shows regular rhythm abdomen soft nontender extremities no COVID cyanosis or edema no rash back exam is normal skin is nondiaphoretic       ED Course     Labs Reviewed   COMP METABOLIC PANEL (14) - Abnormal; Notable for the following components:       Result Value    Glucose 118 (*)     Creatinine 1.29 (*)     eGFR-Cr 41 (*)     All other components within normal limits   TROPONIN I HIGH SENSITIVITY - Normal   CBC WITH DIFFERENTIAL WITH PLATELET   RAINBOW DRAW LAVENDER   RAINBOW DRAW LIGHT GREEN   RAINBOW DRAW BLUE     EKG    Rate, intervals and axes as noted on EKG Report.  Rate: 65  Rhythm: Sinus Rhythm  Reading: Incomplete right bundle branch block                 Differential diagnosis includes acute coronary syndrome pneumothorax         MDM                                         Medical Decision Making  84-year-old female presenting to the emergency department for chest pain IV established cardiac monitor shows a sinus rhythm pulse ox  shows no signs of hypoxia CBC shows normal white cell count metabolic panel stable renal function troponin shows no elevation negative NSTEMI previous troponin done as an outpatient earlier today shows slightly elevated troponin.  Independent interpretation by ED physician chest x-ray shows no pneumothorax.  Patient will be admitted for further evaluation at this time    Amount and/or Complexity of Data Reviewed  Labs: ordered. Decision-making details documented in ED Course.  Radiology: ordered and independent interpretation performed. Decision-making details documented in ED Course.  ECG/medicine tests: ordered and independent interpretation performed. Decision-making details documented in ED Course.        Disposition and Plan     Clinical Impression:  1. Chest pain, rule out acute myocardial infarction         Disposition:  There is no disposition on file for this visit.  There is no disposition time on file for this visit.    Follow-up:  No follow-up provider specified.        Medications Prescribed:  Current Discharge Medication List

## 2024-08-22 NOTE — ED QUICK NOTES
Rounding Completed.    Plan of Care reviewed. Waiting for transport.  Elimination needs assessed.  Provided information regarding new bed assignment - Rm. 2600.    Bed is locked and in lowest position. Call light within reach.

## 2024-08-22 NOTE — HISTORICAL OFFICE NOTE
[facility]  Outside Information  Continuity of Care Document  8/19/2024  Kelly Bettencourt - 84 y.o. Female; born [birth year]Summary of episode note, generated on Aug. 21, 2024August 21, 2024   CHIEF COMPLAINT    CHIEF COMPLAINT  Reason for Visit/Chief Complaint   6 week follow up  D/C Eliquis and Amiodarone   82-year-old female who came into the hospital with A. fib with RVR and low EF as well as a CVA. We did a VANDANA but it showed spontaneous echo contrast we elected to rate control and wait for the spontaneous echo contrast to clear. She has a recent echo that showed EF is improved to 40%. She is very tired and on multiple medications for rate control including digoxin diltiazem and metoprololSince my last visit in March we have been monitoring her INRs closely and finally had consecutive weeks with therapeutic INR. Patient underwent a VANDANA on July 13 that showed resolution of the spontaneous echo contrast to be performed a cardioversion successfully. Patient's family reports that for a few days afterwards she did feel better and look better and her voice was stronger and they noticed this immediately. However subsequently she started feeling back to her usual self. And she is back in A. fib today.  Current visit:Patient underwent an amulet procedure on May 23, 2023. Uncomplicated. Currently on aspirin and clopidogrelvisit June 13, 2024  - Status post RF PVI CTI May 1. Doing well. Medications were decreased due to bradycardiaVisit August 19, 2024  Patient is doing well off amiodarone. Still having a lot of back pain and knee pain. Also having frequent urinary tract infections     PROBLEMS  Reconcile with Patient's ChartPROBLEMS  Problem Effective Dates Date resolved Problem Status   Pre-procedure lab exam, [SNOMED-CT: 200799011] 4/6/2022 - Active   Decreased left ventricular function, [SNOMED-CT: 047920865] 1/16/2022 - Active   Atrial fibrillation with rapid ventricular  response, [SNOMED-CT: 794473229356684] 11/29/2021 - Active   Atrial fibrillation, persistent - AFib, [SNOMED-CT: 217910924] 10/15/2021 - Active   Stroke, [SNOMED-CT: 496654897] 10/15/2021 - Active   Mixed hyperlipidemia, [SNOMED-CT: 542612715] 12/6/2019 - Active   Essential hypertension, [SNOMED-CT: 73636798] 12/6/2019 - Active   Mitral valve (nonrheumatic) prolapse (MVP), [SNOMED-CT: 762331236] 12/6/2019 - Active   Frequent PVCs, [SNOMED-CT: 45529843] 1/15/2021 - Active   Nonrheumatic mitral valve regurgitation, [SNOMED-CT: 668819099] 12/6/2019 - Active   Vertigo, [SNOMED-CT: 496667510] 2/12/2019 - Active   Acute ischemic stroke, [SNOMED-CT: 047199539] 10/11/2021 - Active   Decreased left ventricular function, [SNOMED-CT: 124824203] 1/16/2022 - Active     ENCOUNTER    ENCOUNTER  Problem Effective Dates Date resolved Problem Status   Pre-procedure lab exam, [SNOMED-CT: 131665264] 4/6/2022 - Active   Decreased left ventricular function, [SNOMED-CT: 520524671] 1/16/2022 - Active   Atrial fibrillation with rapid ventricular response, [SNOMED-CT: 514336343962171] 11/29/2021 - Active   Atrial fibrillation, persistent - AFib, [SNOMED-CT: 080997694] 10/15/2021 - Active   Stroke, [SNOMED-CT: 753100434] 10/15/2021 - Active   Mixed hyperlipidemia, [SNOMED-CT: 318456504] 12/6/2019 - Active   Essential hypertension, [SNOMED-CT: 60033787] 12/6/2019 - Active   Mitral valve (nonrheumatic) prolapse (MVP), [SNOMED-CT: 789563616] 12/6/2019 - Active   Frequent PVCs, [SNOMED-CT: 82294780] 1/15/2021 - Active   Nonrheumatic mitral valve regurgitation, [SNOMED-CT: 564891916] 12/6/2019 - Active   Vertigo, [SNOMED-CT: 024487886] 2/12/2019 - Active   Acute ischemic stroke, [SNOMED-CT: 951318272] 10/11/2021 - Active   Decreased left ventricular function, [SNOMED-CT: 747817488] 1/16/2022 - Active     VITAL SIGNS    VITAL SIGNS  Date / Time: 8/19/2024   BP Systolic 100 mmHg   BP Diastolic 60 mmHg   Height 65 inches   Weight 164 lbs   Pulse Rate  52 bpm   BSA (Body Surface Area) 1.9 cc/m2   BMI (Body Mass Index) 27.3 cc/m2   Blood Pressure 100 / 60 mmHg     PHYSICAL EXAMINATION    PHYSICAL EXAMINATION  Header Details   Vitals Left Arm Sitting  / 60 mmHg, Pulse rate 52 bpm, Height in 5' 5\", BMI: 27.3, Weight in 164 lbs (or) 74.39 kgs, BSA : 1.86 cc/m²   General Appearance No Acute Distress, Well groomed, Normal Body habitus   Cardiovascular      ALLERGIES, ADVERSE REACTIONS, ALERTS  Reconcile with Patient's ChartALLERGIES, ADVERSE REACTIONS, ALERTS  Type Substance Reaction Severity Status   Allergies Atorvastatin, [RxNorm: 9927658]   Mild Active   Allergies Cefpodoxime, [RxNorm: 17529]   Mild Active   Allergies Cephalexin, [RxNorm: 2579721]   Mild Active   Allergies Ciprofloxacin, [RxNorm: 308367]   Mild Active   Allergies morphine - Ingredient itcy Mild Active   Allergies Moxifloxacin, [RxNorm: 5467094]   Mild Active   Allergies Nifedipine, [RxNorm: 7417]   Mild Active   Allergies Vantin, [RxNorm: 54968]   Mild Active     MEDICATIONS ADMINISTERED DURING VISIT    No data available    MEDICATIONS  Reconcile with Patient's ChartMEDICATIONS  Medication Start Date Route/Frequency Status   amoxicillin 125 mg chewable tablet, [RxNorm: 387015] 8/19/2024 Take 1 tablet orally 2 times a day. Active   aspirin 81 MG Oral Tab EC, [RxNorm: 963241] 3/29/2022 Take 81 mg by mouth daily. Active   DULoxetine 60 mg capsule,delayed release, [RxNorm: 656951] 12/20/2022 Take 1 capsule orally once a day. Active   EPLERENONE 25 MG TABLET, [RxNorm: 468683] - TAKE 1/2 OF A TABLET BY MOUTH EVERY DAY Active   ERGOCALCIFEROL 1.25 MG (04298 UT) Oral Cap, [RxNorm: 5303015] 3/29/2022 TAKE 1 CAPSULE BY MOUTH ONE TIME PER WEEK Active   fluticasone propionate 110 mcg/actuation HFA aerosol inhaler, [RxNorm: 340181] 6/13/2024 Inhale by mouth once a day. Active   furosemide 20 mg tablet, [RxNorm: 254678] 10/9/2023 Take 1 tablet orally once a day. Active   gabapentin 100 mg capsule, [RxNorm:  015385] 5/16/2024 Take 1 capsule orally once a day at night. Active   hydrocortisone 2.5 % Rectal Cream, [RxNorm: 767058] 3/29/2022 Apply to the affected area twice daily as needed with symptoms Active   methIMAzole 5 mg tablet, [RxNorm: 321420] 4/12/2024 Take one and a half tablet orally once a day. Active   metoprolol succinate ER 25 mg tablet,extended release 24 hr, [RxNorm: 392178] 5/16/2024 Take one-half tablet orally once a day for 90 days. Active   omeprazole (PrilOSEC) 20 MG capsule, [RxNorm: 092249] 3/29/2022 Take 20 mg by mouth daily. Active   rosuvastatin 10 mg tablet, [RxNorm: 040082] 10/9/2023 Take 1 tablet orally once a day. Active   vitamin B12 500 mcg-folic acid 400 mcg tablet, [RxNorm: 514375] 6/13/2024 Take orally once a day. Active     ASSESSMENT    Cardiac Testing Personally ReviewedECG Reviewed -EKG shows Sinus bradycardiaGated CT coronary IMPRESSION:  1. Right dominant coronary arteries with calcium score 329 Agatston units with probably non-obstructive degree of stenosis in the proximal/mid LAD but recommend FFRct analysis to make sure no flow limiting stenoses.  2. Normal aorta and pericardium within limitations of CT technique. Aortic root measures 4.0 cm (long axis) and 3.5-3.7 cm (short axis) at the sinuses of Valsalva.  CONCLUSION: Normal FFRct analysis with no evidence for hemodynamically significant lesions.[facility] echo 1/13/22, LVEF 40%, mild RV enlargement with preserved RV systolic function MVP with mild MRTEE: 12/8/21:  · Severe LV systolic function with estimated LVEF 20-25% with global hypokinesis.  · Mild mitral insufficiency, moderate tricuspid insufficiency and grossly normal pulmonic valves were seen.  · There was a trileaflet aortic valve without stenosis and mild insufficiency.  · Velocities of 20 cm/s were seen in the [place]. There was moderate amount of spontaneous echo contrast but no definite evidence for intracardiac mass or thrombus in the [place].  · There was no evidence for  reversal of flow in the pulmonary veins.  · An agitated saline study and color Doppler flow interrogation of the intra-atrial septum was performed and there was no intracardiac shunting to suggest an ASD or PFO.  · There was no significant atherosclerotic plaque in the visualized aorta without evidence for mobile atheromata or thrombus.  · No pericardial effusion.    Echo: 21:  1. Left ventricle: The cavity size was normal. Wall thickness was normal. Systolic function was moderately to markedly reduced. The estimated ejection fraction was 25-30%. Acoustic contrast opacification revealed no evidence of thrombus.  2. Mitral valve: Leaflet separation was normal. Transvalvular velocity was within the normal range. There was no evidence for stenosis. There was mild regurgitation.  3. Left atrium: The left atrium was moderately dilated.  4. Right atrium: The atrium was moderately dilated.  5. Tricuspid valve: There was mild-moderate regurgitation.  6. Pulmonary arteries: Systolic pressure was mildly to moderately increased, estimated to be 45mm Hg.  Impressions: This study is compared with previous dated 2020: Significant worsening of left ventricle systolic function.Results for JUANPABLO HAYWOOD (MRN [id number]) 2021 10:24  Cholesterol, Total: 118 triglycerides: 62 HDL Cholesterol: 54 LDL Cholesterol Ca#Persistent atrial fibrillation currently rate controlled with an EF that is improved from 20% to 40%  -Chads vas score of 6  --Patient has an amulet device for stroke risk reduction  -= Status post A-fib ablation doing well  - Last visit we discontinued Eliquis and amiodarone  - In 3 months I would like her to do a 7-day MCT monitor and hopefully will be able to come off metoprolol at the next visit#Essential hypertension#Nonischemic cardiomyopathy possibly tachycardia mediated follows with Dr. Gates# []Follow-up in 3 months     FAMILY HISTORY    FAMILY HISTORY  Relationship Age Diagnosis   Mother 0 Family  history of heart disease - FHx     GENERAL STATUS    No data available    PAST MEDICAL HISTORY    PAST MEDICAL HISTORY  Problem Date diagonsed Date resolved Status   Pre-procedure lab exam, [SNOMED-CT: 634309062] 4/6/2022 - Active   Decreased left ventricular function, [SNOMED-CT: 761706385] 1/16/2022 - Active   Atrial fibrillation with rapid ventricular response, [SNOMED-CT: 681211232840893] 11/29/2021 - Active   Atrial fibrillation, persistent - AFib, [SNOMED-CT: 393592334] 10/15/2021 - Active   Stroke, [SNOMED-CT: 650015926] 10/15/2021 - Active   Mixed hyperlipidemia, [SNOMED-CT: 746769478] 12/6/2019 - Active   Essential hypertension, [SNOMED-CT: 61511954] 12/6/2019 - Active   Mitral valve (nonrheumatic) prolapse (MVP), [SNOMED-CT: 932977764] 12/6/2019 - Active   Frequent PVCs, [SNOMED-CT: 57118911] 1/15/2021 - Active   Nonrheumatic mitral valve regurgitation, [SNOMED-CT: 623844863] 12/6/2019 - Active   Vertigo, [SNOMED-CT: 000856976] 2/12/2019 - Active   Acute ischemic stroke, [SNOMED-CT: 391138417] 10/11/2021 - Active   Decreased left ventricular function, [SNOMED-CT: 412737871] 1/16/2022 - Active     HISTORY OF PRESENT ILLNESS    82-year-old female who came into the hospital with A. fib with RVR and low EF as well as a CVA. We did a VANDANA but it showed spontaneous echo contrast we elected to rate control and wait for the spontaneous echo contrast to clear. She has a recent echo that showed EF is improved to 40%. She is very tired and on multiple medications for rate control including digoxin diltiazem and metoprololSince my last visit in March we have been monitoring her INRs closely and finally had consecutive weeks with therapeutic INR. Patient underwent a VANDANA on July 13 that showed resolution of the spontaneous echo contrast to be performed a cardioversion successfully. Patient's family reports that for a few days afterwards she did feel better and look better and her voice was stronger and they noticed this  immediately. However subsequently she started feeling back to her usual self. And she is back in A. fib today.  Current visit:Patient underwent an amulet procedure on May 23, 2023. Uncomplicated. Currently on aspirin and clopidogrelvisit June 13, 2024  - Status post RF PVI CTI May 1. Doing well. Medications were decreased due to bradycardiaVisit August 19, 2024  Patient is doing well off amiodarone. Still having a lot of back pain and knee pain. Also having frequent urinary tract infections     IMMUNIZATIONS  Reconcile with Patient's ChartNo data available    PLAN OF CARE    PLAN OF CARE  Planned Care Date   EKG (electrocardiogram) 1/1/1900   Monitor - MCT/Telemetry 1/1/1900   Follow up visit - Arnol Macedo MD 1/1/1900     PROCEDURES    No data available    RESULTS    RESULTS  Name Result Date Location - Ordered By   GLUCOSE [LOINC: 2339-0] 111 mg/dL [High] 04/10/2024 11:27:00 AM [facility] ([facility])  Address: [address] [place] [address]  tel:   SODIUM [LOINC: 2951-2] 138 mmol/L 04/10/2024 11:27:00 AM [facility] ([facility])  Address: [address] [place] [address]  [address]  tel:   POTASSIUM [LOINC: 2823-3] 4.1 mmol/L 04/10/2024 11:27:00 AM [facility] ([facility])  Address: [address] [place] [address]  [address]  tel:   CHLORIDE [LOINC: 2075-0] 105 mmol/L 04/10/2024 11:27:00 AM [facility] ([facility])  Address: [address] [place] [address]  [address]  tel:   CO2 [LOINC: 2028-9] 28.0 mmol/L 04/10/2024 11:27:00 AM [facility] ([facility])  Address: [address] [place] [address]  [address]  tel:   ANION GAP [LOINC: 33037-3] 5 mmol/L 04/10/2024 11:27:00 AM [facility] ([facility])  Address: [address] [place] [address]  [address]  tel:   BUN [LOINC: 6299-2] 24 mg/dL [High] 04/10/2024 11:27:00 AM [facility] ([facility])  Address:  [address] [place] [address]  [address]  tel:   CREATININE [LOINC: 16824-3] 1.14 mg/dL [High] 04/10/2024 11:27:00 AM [facility] ([facility])  Address: [address] [place] [address]  tel:   CALCIUM [LOINC: 08296-8] 9.4 mg/dL 04/10/2024 11:27:00 AM [facility] ([facility])  Address: [address] [place] [address]  tel:   OSMOLALITY CALCULATED [LOINC: 56532-9] 291 mOsm/kg 04/10/2024 11:27:00 AM [facility] ([facility])  Address: [address] [place] [address]  tel:   E GFR CR [LOINC: 17459-4] 48 mL/min/1.73m2 [Low] 04/10/2024 11:27:00 AM [facility] ([facility])  Address: [address] [place] [address]  tel:   AST [LOINC: 1920-8] 29 U/L 04/10/2024 11:27:00 AM [facility] ([facility])  Address: [address] [place] [address]  tel:   ALT [LOINC: 1742-6] 48 U/L 04/10/2024 11:27:00 AM [facility] ([facility])  Address: [address] [place] [address]  tel:   ALKALINE PHOSPHATASE [LOINC: 1779-8] 74 U/L 04/10/2024 11:27:00 AM [facility] ([facility])  Address: [address] [place] [address]  tel:   BILIRUBIN, TOTAL [LOINC: 1975-2] 0.8 mg/dL 04/10/2024 11:27:00 AM [facility] ([facility])  Address: [address] [place] [address]  tel:   TOTAL PROTEIN [LOINC: 2885-2] 6.7 g/dL 04/10/2024 11:27:00 AM [facility] ([facility])  Address: [address] [place] [address]  tel:   ALBUMIN [LOINC: 1751-7] 3.5 g/dL 04/10/2024 11:27:00 AM [facility] ([facility])  Address: [address] [place] [address]  tel:   GLOBULIN [LOINC: 65827-5] 3.2 g/dL 04/10/2024 11:27:00 AM [facility] ([facility])  Address: [address] [place] [address]  tel:   A/G RATIO [LOINC: 1759-0]  1.1 04/10/2024 11:27:00 AM [facility] ([facility])  Address: [address] [place] [address]  [address]  tel:   FASTING PATIENT CMP ANSWER [LOINC: 44933-1] Yes 04/10/2024 11:27:00 AM [facility] ([facility])  Address: [address] [place] [address]  [address]  tel:   CHOLESTEROL [LOINC: 2093-3] 166 mg/dL 04/10/2024 11:27:00 AM [facility] ([facility])  Address: [address] [place] [address]  [address]  tel:   HDL CHOL [LOINC: 2085-9] 64 mg/dL [High] 04/10/2024 11:27:00 AM [facility] ([facility])  Address: [address] [place] [address]  [address]  tel:   TRIGLYCERIDES [LOINC: 2571-8] 123 mg/dL 04/10/2024 11:27:00 AM [facility] ([facility])  Address: [address] [place] [address]  [address]  tel:   LDL CHOLESTROL [LOINC: 52340-4] 80 mg/dL 04/10/2024 11:27:00 AM [facility] ([facility])  Address: [address] [place] [address]  [address]  tel:   VLDL [LOINC: 98305-4] 19 mg/dL 04/10/2024 11:27:00 AM [facility] ([facility])  Address: [address] [place] [address]  [address]  tel:   NON HDL CHOL [LOINC: 47725-5] 102 mg/dL 04/10/2024 11:27:00 AM [facility] ([facility])  Address: [address] [place] [address]  [address]  tel:   FASTING PATIENT LIPID ANSWER [LOINC: 83285220] Yes 04/10/2024 11:27:00 AM [facility] ([facility])  Address: [address] [place] [address]  [address]  tel:   POCT CREATININE [LOINC: 09504-8] 1.40 mg/dL [High] 03/19/2024 01:07:00 PM [facility])  Address: [address] [place] [address]  tel:   E GFR CR [LOINC: 69901-3] 38 mL/min/1.73m2 [Low] 03/19/2024 01:07:00 PM [facility])  Address: [address] [place] [address]  tel:   Trans Thoracic Echocardiogram 1.The study quality is good.  2.The left ventricle is mildly enlarged. Global left ventricular systolic function is normal. The left ventricle diastolic function is impaired (Grade I) with normal left atrial pressure. The wall thickness is within normal limits. The left ventricular ejection fraction is 55-60%. No regional wall motion abnormality is noted.3.The right ventricle is normal in size. Right ventricular systolic function is normal. Tapse=1.64cms.4.The left atrium is moderately enlarged based on the left atrium volume index of 44.6ml/m².5.Mild mitral annular calcification is noted. Mild (1+) mitral regurgitation. Mild mitral valve prolapse is noted. 6.Focal thickening of the aortic valve cusps is noted. Mild (1+) aortic regurgitation. 4/4/2024 12:30:00 PM Allen Gates MD   Ambulatory Telemetry Monitor 1.This is a good quality study. 2.Predominant rhythm is sinus bradycardia. 3.The minimum heart rate recorded was 42 beats / minute. The maximum heart rate is 93 beats / minute. The mean heart rate is 50 beats / minute. 4.A FIB [place] 0% 10/14/2022 2:00:00 PM Lourdes Gillette MD   Holter Monitoring 1.This is a good quality study. 2.Predominant rhythm is atrial fibrillation. 3.The minimum heart rate recorded was 72 beats / minute (10/15/2021). The maximum heart rate is 154 beats / minute (10/16/2021). The mean heart rate is 98 beats / minute. 4.*The predominant rhythm was sustained atrial fibrillation with occurrences of RVR. *The Maximum Heart Rate recorded was 154 bpm, the Minimum Heart Rate recorded was 72 bpm, and the Average Heart Rate was 98 bpm. *There were rare PVCs. *There were 2 Patient triggered events correlating with AF, rates in the 90s.. 10/15/2021 11:00:00 AM Lourdes Gillette MD     REVIEW OF SYSTEMS    REVIEW OF SYSTEMS  Header Details   Cardiovascular No history of Chest pain, KIM, Palpitations, Syncope, PND, Orthopnea, Edema, Claudication     SOCIAL HISTORY    SOCIAL HISTORY  Social History Element Description Effective Dates   Smoking  status Never smoked -     FUNCTIONAL STATUS    No data available    MEDICAL EQUIPMENT    No data available    Goals Sections    No data available    REASON FOR REFERRAL               Health Concerns Section    No data available    COGNITIVE/MENTAL STATUS    No data available    Patient Demographics    Patient Demographics  Patient Address Patient Name Communication   [address] [place] [address] [place] [address] Kelly Bettencourt [phone] (Home)     Patient Demographics  Language Race / Ethnicity Marital Status   Unknown - Spoken White / Unknown      Document Information    Primary Care Provider Other Service Providers Document Coverage Dates   Arnol Macedo  NPI: [id number]  [phone] (Work)  [address] [place] [address]  [place] [address]  Interpreting Physicians  [facility]  [phone] (Work)  [address] [place] [address] Dina Strickland  NPI: [id number]  [phone] (Work)  [address] [place] [address]  [place] [address]  Nurses     Edie Chavira  NPI: [id number]  [phone] (Work)  [address] [place] [address]  [place] [address]  Nurses Aug. 19, 2024August 19, 2024      Organization   [facility]  [phone] (Work)  [address] [place] [address]  [place] [address]     Encounter Providers Encounter Date    Aug. 19, 2024August 19, 2024     Legal Authenticator    Arnol Macedo  NPI: [id number]  [phone] (Work)  [address] [place] [address]  [place] [address]

## 2024-08-22 NOTE — ED QUICK NOTES
Rounding Completed.    Plan of Care reviewed. Waiting for bed assignment.  Elimination needs assessed.  Provided chhaya & saltine crackers per Pt's request. Pt also provided with belongings bag.    Bed is locked and in lowest position. Call light within reach.

## 2024-08-22 NOTE — ED QUICK NOTES
Pt verbalized concern that she's currently being treated for UTI and taking Augmentin - missed PM dose. ED Physician - Dr. Chandler made aware and gave verbal order to give 1 dose here in the ED.

## 2024-08-22 NOTE — PLAN OF CARE
Acquired patient around 0730. Alert and oriented x4. On Ra. Spo2 within normal limits. Pt denies SOB at this time. Pt denies cp/ epigastric pain at this time. SB on tele. Pt refusing echocardiogram at this time, Cardiology aware. Plan for formal cardiology visit today. Continent of bowel and bladder. Call light within reach. Continue plan of care.     Problem: CARDIOVASCULAR - ADULT  Goal: Maintains optimal cardiac output and hemodynamic stability  Description: INTERVENTIONS:  - Monitor vital signs, rhythm, and trends  - Monitor for bleeding, hypotension and signs of decreased cardiac output  - Evaluate effectiveness of vasoactive medications to optimize hemodynamic stability  - Monitor arterial and/or venous puncture sites for bleeding and/or hematoma  - Assess quality of pulses, skin color and temperature  - Assess for signs of decreased coronary artery perfusion - ex. Angina  - Evaluate fluid balance, assess for edema, trend weights  Outcome: Progressing  Goal: Absence of cardiac arrhythmias or at baseline  Description: INTERVENTIONS:  - Continuous cardiac monitoring, monitor vital signs, obtain 12 lead EKG if indicated  - Evaluate effectiveness of antiarrhythmic and heart rate control medications as ordered  - Initiate emergency measures for life threatening arrhythmias  - Monitor electrolytes and administer replacement therapy as ordered  Outcome: Progressing     Problem: PAIN - ADULT  Goal: Verbalizes/displays adequate comfort level or patient's stated pain goal  Description: INTERVENTIONS:  - Encourage pt to monitor pain and request assistance  - Assess pain using appropriate pain scale  - Administer analgesics based on type and severity of pain and evaluate response  - Implement non-pharmacological measures as appropriate and evaluate response  - Consider cultural and social influences on pain and pain management  - Manage/alleviate anxiety  - Utilize distraction and/or relaxation techniques  - Monitor for  opioid side effects  - Notify MD/LIP if interventions unsuccessful or patient reports new pain  - Anticipate increased pain with activity and pre-medicate as appropriate  Outcome: Progressing

## 2024-08-22 NOTE — PROGRESS NOTES
Reviewed discharge plan of care with patient and family. Verbalized understanding. IV removed. Tele removed. All questions answered. Family as discharge transportation.

## 2024-08-22 NOTE — CONSULTS
[facility]  Cardiology Consultation    Kelly Bettencourt Patient Status:  Observation    1940 MRN [id number]   Location [facility] 2NE-A Attending Silver Lopez MD   Hosp Day # 0 PCP Oliva Lai MD     Reason for Consultation:  Atypical chest pain, elevated hs-troponin    History of Present Illness:  Kelly Bettencourt is a a(n) 84 year old female who presents with abnormal troponin test ordered as an outpatient by PCP.  She has followed in the past with myself and recent office note has been copied into current EMR chart for reference.  She reports recent increased belching and would have chest discomfort afterwards.  She does not have any currently.  She does not report exertional chest pain.   She denies dyspnea, orthopnea, PND or LE swelling.  She denies palpitations, lightheadedness, dizziness, presyncope or syncope.  She denies fever, chills, nausea/vomiting or diarrhea.  She is on antibiotics for UTI.    History:  Past Medical History:    Allergy to mold spores    Arrhythmia    AFIB    Arthritis    Back problem    Cataract    Disorder of thyroid    Dry eyes    Dust allergy    Esophageal reflux    Fibromyalgia    Neurologist     Heart valve disease    High blood cholesterol    High blood pressure    History of blood transfusion    Hypertension    Cardiologist, Dr. Gates - annually    Hypothyroidism    IBS (irritable bowel syndrome)    Incontinence    Muscle weakness    Osteopenia    Pneumonia due to organism    Prediabetes    Scoliosis    No surgical intervention, taking physical therapy     Stroke (HCC)    Thyroid disease    Endocrinologist followed     Visual impairment     Past Surgical History:   Procedure Laterality Date    Appendectomy      Cataract      Cath ep  2024    Cholecystectomy      Knee replacement surgery Left     Knee surgery  12    LEFT TKA - Dr. Johnson    Ndsc ablation & rcnstj atria lmtd w/o bypass      Repair retinal detach, cplx      Tonsillectomy       Total knee replacement       Family History   Problem Relation Age of Onset    Hypertension Father     Cancer Father     Hypertension Mother     Heart Disease Mother     Cancer Mother     Heart Disease Maternal Grandmother     Hypertension Maternal Grandmother       reports that she has never smoked. She has never used smokeless tobacco. She reports that she does not drink alcohol and does not use drugs.    Allergies:  Allergies   Allergen Reactions    Avelox [Moxifloxacin Hydrochloride]      Weakness      Cephalexin RASH    Ciprofloxacin OTHER (SEE COMMENTS)     tendonitis    Morphine ITCHING    Vantin RASH    Cefpodoxime OTHER (SEE COMMENTS)    Lipitor [Atorvastatin] RASH    Nifedipine FATIGUE     Oral       Medications:    Current Facility-Administered Medications:     aspirin DR tab 81 mg, 81 mg, Oral, Daily    DULoxetine (Cymbalta) DR cap 60 mg, 60 mg, Oral, Daily    eplerenone (Inspra) tab 12.5 mg, 12.5 mg, Oral, Daily    furosemide (Lasix) tab 20 mg, 20 mg, Oral, Daily    gabapentin (Neurontin) cap 100 mg, 100 mg, Oral, Nightly    rosuvastatin (Crestor) tab 10 mg, 10 mg, Oral, Nightly    pantoprazole (Protonix) DR tab 40 mg, 40 mg, Oral, QAM AC    methIMAzole (Tapazole) tab 7.5 mg, 7.5 mg, Oral, Daily    acetaminophen (Tylenol Extra Strength) tab 500 mg, 500 mg, Oral, Q4H PRN    acetaminophen (Tylenol) tab 650 mg, 650 mg, Oral, Q4H PRN **OR** HYDROcodone-acetaminophen (Norco) 5-325 MG per tab 1 tablet, 1 tablet, Oral, Q4H PRN **OR** HYDROcodone-acetaminophen (Norco) 5-325 MG per tab 2 tablet, 2 tablet, Oral, Q4H PRN    melatonin tab 3 mg, 3 mg, Oral, Nightly PRN    polyethylene glycol (PEG 3350) (Miralax) 17 g oral packet 17 g, 17 g, Oral, Daily PRN    sennosides (Senokot) tab 17.2 mg, 17.2 mg, Oral, Nightly PRN    bisacodyl (Dulcolax) 10 MG rectal suppository 10 mg, 10 mg, Rectal, Daily PRN    ondansetron (Zofran) 4 MG/2ML injection 4 mg, 4 mg, Intravenous, Q6H PRN    metoclopramide (Reglan) 5 mg/mL  injection 5 mg, 5 mg, Intravenous, Q8H PRN    enoxaparin (Lovenox) 40 MG/0.4ML SUBQ injection 40 mg, 40 mg, Subcutaneous, Daily    amoxicillin clavulanate (Augmentin) 875-125 MG per tab 875 mg, 875 mg, Oral, Q12H    metoprolol succinate (Toprol XL) partial tablet 12.5 mg, 12.5 mg, Oral, Daily Beta Blocker    Review of Systems:  A comprehensive review of systems was negative if not otherwise mention in above HPI.    /63 (BP Location: Right arm)   Pulse 53   Temp 97.1 °F (36.2 °C) (Axillary)   Resp 16   Ht 5' 5\" (1.651 m)   Wt 162 lb 14.7 oz (73.9 kg)   SpO2 99%   BMI 27.11 kg/m²   Temp (24hrs), Av.4 °F (36.3 °C), Min:97.1 °F (36.2 °C), Max:97.6 °F (36.4 °C)       Intake/Output Summary (Last 24 hours) at 2024 1608  Last data filed at 2024 0831  Gross per 24 hour   Intake --   Output 1050 ml   Net -1050 ml     Wt Readings from Last 3 Encounters:   24 162 lb 14.7 oz (73.9 kg)   24 162 lb (73.5 kg)   23 158 lb (71.7 kg)       Physical Exam:   General: Alert and oriented x 3. No apparent distress. No respiratory or constitutional distress.  HEENT: Normocephalic, anicteric sclera, neck supple.  Neck: No JVD, carotids 2+, no bruits.  Cardiac: Regular rate and rhythm. S1, S2 normal. No murmur, pericardial rub, S3.  Lungs: Clear without wheezes, rales, rhonchi or dullness.  Normal excursions and effort.  Abdomen: Soft, non-tender.   Extremities: Without clubbing, cyanosis or edema.  Peripheral pulses are 2+.  Neurologic: Alert and oriented, normal affect.  Skin: Warm and dry.     Laboratory Data:  Lab Results   Component Value Date    WBC 7.1 2024    HGB 13.1 2024    HCT 39.7 2024    .0 2024    CREATSERUM 1.29 2024    BUN 22 2024     2024    K 3.6 2024     2024    CO2 32.0 2024     2024    CA 10.0 2024    ALB 4.4 2024    ALKPHO 76 2024    BILT 0.8 2024    TP 7.0  08/21/2024    AST 19 08/21/2024    ALT 15 08/21/2024     Recent Labs   Lab 08/21/24  1859 08/22/24  0559 08/22/24  1156   TROPHS 13 16 13     Outside lab hs-troponin 34 and upper limits of normal 14    Imaging:  EKG 8/21/2024: sinus with PVC and non-specific T wave changes    CXR 8/21/2024: FINDINGS:  Low lung volumes limit assessment.  Heart size is within normal limits.  No pleural effusion or focal consolidation is seen.  If clinical symptoms persist then recommend follow-up imaging.     Impression:  Atypical chest pain  Minimally elevated hs-troponin at an outside labs  CAD - previous invasive angiogram unremarkable but gated CT coronary 2022 with CAC score 329 and non-obstructive with negative FFRct  PAF s/p WATCHMAN and has had prior ablation procedures followed by Dr. Macedo  Non-ischemic cardiomyopathy - improved after treatment of afib w RVR (tachymediated cardiomyopathy)  Hx CVA embolic in September 2021  Mitral valve prolapse with mitral regurgitation    Recommendations:  -tele monitoring  -serial troponin here negative and very atypical chest pain by history  -I discussed option of invasive angiogram tomorrow vs inpatient nuclear SPECT versus outpatient cardiac nuclear PET; patient prefers outpatient nuclear cardiac PET which I am comfortable if inpatient echo unchanged  -continue ASA 81 mg daily, BB, lasix, eplerenone and statin    D/w patient and  at bedside.    Thank you for allowing me to participate in the care of your patient.    Allen Gates MD  8/22/2024  4:08 PM

## 2024-08-22 NOTE — PLAN OF CARE
Kelly Bettencourt Patient Status:  Observation    1940 MRN [id number]   Location [facility] 2NE-A Attending Silver Lopez MD   Hosp Day # 0 PCP Oliva Lai MD       Cardiology Nocturnal APN Note    Page Received: Primary RN    HPI:     Patient is a 84 year old female with PMH of atrial fibrillation s/p ablation and Watchman device, HTN, HLD, CVA, mitral valve prolapse, mitral valve regurgitation, HFrEF, nonischemic cardiomyopathy, CAD, and CKD stage III, nodular thyroid disease, and pre diabetes who was admitted for elevated troponin. Pt reported having a feeling of heartburn and was seen by her PCP. Outpatient blood tested showed troponin was elevated. Pt was then instructed to go to the ED for further evaluation. Cardiac work up was negative for acute findings. Troponin was negative and EKG showed no acute ischemic changes. [facility]CI consulted for chest pain r/o ACS. Echocardiogram 2024 showed normal left ventricular systolic function. EF 55-60%. Grade I diastolic dysfunction. CTA coronary arteries in  showed no evidence for hemodynamically significant lesions. HPI obtained from chart review and information provided by ED physician.       ED Clinical Course    EKG: Sinus rhythm with occasional PVCs.    Labs: Cr 1.29, troponin 13    Imaging: CXR unremarkable    Medications: Augmentin        Assessment/Plan:    _ Troponin T (high sensitivity) as outpatient 34.19. Troponin negative on labs done in ED. Continue to trend  - Continue to monitor overnight on telemetry  - Formal cardiology consult to follow in AM.       CRUZITO Torres  [facility]  2024  3:15 AM

## 2024-08-22 NOTE — IMAGING NOTE
[facility]  Outside Information  Continuity of Care Document  4/4/2024  Kelly Bettencourt - 84 y.o. Female; born [birth year]Trans Thoracic Echocardiogram, generated on Jun. 04, 2024June 04, 2024   Findings    Findings - Right Atrium  The right atrium is normal in size. Right atrial diameter is 3.5 cms.    Findings - Left Ventricle  The left ventricle is mildly enlarged. Left ventricular diastolic dimension is 5.4 cms. Left ventricular systolic dimension is 3.5 cms. Left ventricular diastolic septal thickness is 1.0 cm. Left ventricular diastolic postero basal free wall thickness is 0.9 cm. Global left ventricular systolic function is normal. The left ventricle diastolic function is impaired (Grade I) with normal left atrial pressure. Left ventricular outflow tract diameter is 2.0 cms. Left ventricular outflow tract VTI is 19.4 cms. Left ventricular outflow tract peak velocity is 0.7 m/s. Left ventricular peak gradient is 2 mmHg. Left ventricular outflow tract mean velocity is 0.5 m/s. Left ventricular mean gradient is 1 mmHg. The wall thickness is within normal limits. The left ventricular ejection fraction is 55-60%. No regional wall motion abnormality is noted.   Findings - Right Ventricle  The right ventricle is normal in size. Right ventricular systolic function is normal. Right ventricular diastolic dimension is 3.8 cms. Tapse=1.64cms.   Findings - Atrial Septum  The atrial septum is normal.    Findings - Ventricular Septum  The ventricular septum is normal.    Findings - Pulmonary Artery  The estimated pulmonary artery systolic pressure is 22 mmHg assuming a right atrial pressure of 3 mmHg.    Findings - Pulmonary Vein  Pulmonary venous flow demonstrates systolic blunting.    Findings - IVC  The inferior vena cava is normal in size. The inferior vena cava changes greater than 50 percent during respiration. IVC diameter=1.05cms.   Findings - Pulmonic Valve  Good excursion  of the pulmonic valve cusps is noted. Evidence of pulmonic regurgitation is noted. Trace pulmonic regurgitation. The peak velocity is 0.7 m/s. The mean velocity is 0.6 m/s. The peak trans pulmonic gradient is 2.0 mmHg. The mean trans pulmonic gradient is 2.0 mmHg. Pulmonic valve appears normal.   Findings - Tricuspid Valve  Evidence of tricuspid regurgitation is present. Mild (1+) tricuspid regurgitation. The peak tricuspid regurgitant velocity is 2.2 m/s. The peak trans tricuspid gradient is 19.0 mmHg. Tricuspid valve appears normal.   Findings - Mitral Valve  Mobility of the anterior mitral leaflet is normal. Mobility of the posterior mitral leaflet is normal. The anterior mitral leaflet is mildly thickened. The posterior mitral leaflet is mildly thickened. Mild mitral annular calcification is noted. The area by pressure half time is 2.0 cm². The mean trans mitral gradient is 1.0 mmHg. Mitral regurgitation is noted. Mild (1+) mitral regurgitation. The pressure half time is 111 ms. The deceleration time is 338 ms. The peak mitral gradient is 2 mmHg. The E velocity is 0.4 m/s. The A velocity is 0.7 m/s. The E/E' is 10.2. Mitral valve prolapse is noted. The prolapse is mild. Both the anterior and posterior mitral leaflets are prolapsed.    Findings - Aortic Valve  The aortic valve is tricuspid. Focal thickening of the aortic valve cusps is noted. Aortic cusp separation measures 2.0 cms . Aortic valve area continuity equation is 2.9 cm². Noted evidence of aortic regurgitation. Mild (1+) aortic regurgitation. The trans-aortic peak velocity is 0.9 m/s. The trans-aortic peak gradient is 3 mmHg. The trans-aortic mean velocity is 0.6 m/s. The trans-aortic mean gradient is 2.0 mmHg. Aortic valve VTI measures 20.8 cms. LVOT Diameter is 2.0 cms. Aortic valve appears normal.   Findings - Aorta  Aortic root diameter is 3.7 cms. Ascending aorta diameter is 3.6 cms. The aortic arch is not well visualized.    Findings -  Pericardium  The pericardium is normal in appearance with no pericardial effusion noted..    Impressions    Impression - TTE  The study quality is good.    Impression - TTE  The left ventricle is mildly enlarged. Global left ventricular systolic function is normal. The left ventricle diastolic function is impaired (Grade I) with normal left atrial pressure. The wall thickness is within normal limits. The left ventricular ejection fraction is 55-60%. No regional wall motion abnormality is noted.   Impression - TTE  The right ventricle is normal in size. Right ventricular systolic function is normal. Tapse=1.64cms.   Impression - TTE  The left atrium is moderately enlarged based on the left atrium volume index of 44.6ml/m².   Impression - TTE  Mild mitral annular calcification is noted. Mild (1+) mitral regurgitation. Mild mitral valve prolapse is noted.    Impression - TTE  Focal thickening of the aortic valve cusps is noted. Mild (1+) aortic regurgitation.   Patient Demographics    Patient Demographics  Patient Address Patient Name Communication   [address] [place] [address]   [place] [address] Kelly Bettencourt [phone] (Home)     Patient Demographics  Language Race / Ethnicity Marital Status   Unknown Unknown / Unknown Unknown     Document Information    Service Providers Document Coverage Dates   Allen Gates MD  [phone] (Work)  [address] [place] [id number] Apr. 04, 2024April 04, 2024

## 2024-08-22 NOTE — PROGRESS NOTES
08/22/24 0036 08/22/24 0038 08/22/24 0040   Vital Signs   Pulse 58 57 55   Heart Rate Source Monitor Monitor Monitor   Resp 15 14 14   Respiratory Quality Normal Normal Normal   /78 154/81 137/82   MAP (mmHg) (!) 102 (!) 102 98   BP Location Right arm Right arm Right arm   BP Method Automatic Automatic Automatic   Patient Position Lying Sitting Standing       Admitting orthostatic b/p check. Pt denies dizziness when standing.

## 2024-08-22 NOTE — ED QUICK NOTES
Orders for admission, patient is aware of plan and ready to go upstairs. Any questions, please call ED RN Tracy at extension [phone].     Patient Covid vaccination status: Fully vaccinated     COVID Test Ordered in ED: None    COVID Suspicion at Admission: N/A    Running Infusions:  None    Mental Status/LOC at time of transport: A/A/ Ox 4    Other pertinent information: Please see 2134 ED Quick Notes and MAR at 2146.  CIWA score: N/A   NIH score:  N/A

## 2024-08-22 NOTE — HISTORICAL OFFICE NOTE
[facility]  Outside Information  Continuity of Care Document  4/12/2024  Kelly Bettencourt - 84 y.o. Female; born [birth year]Summary of episode note, generated on Aug. 22, 2024August 22, 2024   CHIEF COMPLAINT    CHIEF COMPLAINT  Reason for Visit/Chief Complaint   2 month follow up   No chest pain, dyspnea, orthopnea, PND or increased lower extremity pain/swelling. No palpitations, presyncope or syncope. No fever, nausea/vomiting, diarrhea or urinary complaints. (patient of mine) reports she is more stronger and still working with PT but now working on back.Seen earlier than scheduled visit to review blood pressure and medications.  BP at home initially SBP 160s early in the week but recently -130s and DBP 50-80s.     PROBLEMS  Reconcile with Patient's ChartPROBLEMS  Problem Effective Dates Date resolved Problem Status   Pre-procedure lab exam, [SNOMED-CT: 530238357] 4/6/2022 - Active   Decreased left ventricular function, [SNOMED-CT: 535740559] 1/16/2022 - Active   Atrial fibrillation with rapid ventricular response, [SNOMED-CT: 291460084286861] 11/29/2021 - Active   Atrial fibrillation, persistent - AFib, [SNOMED-CT: 504016358] 10/15/2021 - Active   Stroke, [SNOMED-CT: 668218845] 10/15/2021 - Active   Mixed hyperlipidemia, [SNOMED-CT: 330210041] 12/6/2019 - Active   Essential hypertension, [SNOMED-CT: 96134182] 12/6/2019 - Active   Mitral valve (nonrheumatic) prolapse (MVP), [SNOMED-CT: 818551115] 12/6/2019 - Active   Frequent PVCs, [SNOMED-CT: 79182738] 1/15/2021 - Active   Nonrheumatic mitral valve regurgitation, [SNOMED-CT: 292801449] 12/6/2019 - Active   Vertigo, [SNOMED-CT: 693328124] 2/12/2019 - Active   Acute ischemic stroke, [SNOMED-CT: 092407904] 10/11/2021 - Active   Decreased left ventricular function, [SNOMED-CT: 924233141] 1/16/2022 - Active     ENCOUNTER DIAGNOSIS    ENCOUNTER DIAGNOSIS  Problem Effective Dates Date resolved Problem Status    Pre-procedure lab exam, [SNOMED-CT: 914904605] 4/6/2022 - Active   Decreased left ventricular function, [SNOMED-CT: 652206961] 1/16/2022 - Active   Atrial fibrillation with rapid ventricular response, [SNOMED-CT: 641264019108952] 11/29/2021 - Active   Atrial fibrillation, persistent - AFib, [SNOMED-CT: 497568613] 10/15/2021 - Active   Stroke, [SNOMED-CT: 772763672] 10/15/2021 - Active   Mixed hyperlipidemia, [SNOMED-CT: 966363171] 12/6/2019 - Active   Essential hypertension, [SNOMED-CT: 20274937] 12/6/2019 - Active   Mitral valve (nonrheumatic) prolapse (MVP), [SNOMED-CT: 580677504] 12/6/2019 - Active   Frequent PVCs, [SNOMED-CT: 16882224] 1/15/2021 - Active   Nonrheumatic mitral valve regurgitation, [SNOMED-CT: 380808904] 12/6/2019 - Active   Vertigo, [SNOMED-CT: 765199952] 2/12/2019 - Active   Acute ischemic stroke, [SNOMED-CT: 890760871] 10/11/2021 - Active   Decreased left ventricular function, [SNOMED-CT: 611625410] 1/16/2022 - Active     VITAL SIGNS    VITAL SIGNS  Date / Time: 4/12/2024   BP Systolic 120 mmHg   BP Diastolic 64 mmHg   Height 65 inches   Weight 162 lbs   Pulse Rate 64 bpm   BSA (Body Surface Area) 1.8 cc/m2   BMI (Body Mass Index) 27 cc/m2   Blood Pressure 120 / 64 mmHg     PHYSICAL EXAMINATION    PHYSICAL EXAMINATION  Header Details   Vitals Right Arm Sitting  / 64 mmHg, Pulse rate 64 bpm, Regular, Height in 5' 5\", BMI: 27, Weight in 162 lbs (or) 73.48 kgs, BSA : 1.85 cc/m²   General Appearance No Acute Distress, Appropriate   Head/Eyes/Ears/Nose/Mouth/Throat Conjunctiva pink, Sclera Clear, Mucous membranes Moist   Neck Normal carotid pulsations, No carotid bruits, No JVD   Respiratory Unlabored, Lungs clear with normal breath sounds, Equal bilaterally   Cardiovascular Intact distal pulses, Regular rhythm. Normal rate present. Normal and normal S1 and S2   Gastrointestinal Abdomen soft, Non-tender   Gait Walks with walker   Lower Extremities trace b/l feet edema   Skin Warm and dry,  Intact   Neurologic / Psychiatric Alert and Oriented   Speech Normal speech     ALLERGIES, ADVERSE REACTIONS, ALERTS  Reconcile with Patient's ChartALLERGIES, ADVERSE REACTIONS, ALERTS  Type Substance Reaction Severity Status   Allergies Atorvastatin, [RxNorm: 5409980]   Mild Active   Allergies Cefpodoxime, [RxNorm: 94902]   Mild Active   Allergies Cephalexin, [RxNorm: 8941425]   Mild Active   Allergies Ciprofloxacin, [RxNorm: 181435]   Mild Active   Allergies morphine - Ingredient itcy Mild Active   Allergies Moxifloxacin, [RxNorm: 2857166]   Mild Active   Allergies Nifedipine, [RxNorm: 7417]   Mild Active   Allergies Vantin, [RxNorm: 92310]   Mild Active     MEDICATIONS ADMINISTERED DURING VISIT    No data available    MEDICATIONS  Reconcile with Patient's ChartMEDICATIONS  Medication Start Date Route/Frequency Status   amiodarone 200 mg tablet, [RxNorm: 691356] 12/19/2023 Take 1 tablet orally once a day. Active   aspirin 81 MG Oral Tab EC, [RxNorm: 263119] 3/29/2022 Take 81 mg by mouth daily. Active   DULoxetine 60 mg capsule,delayed release, [RxNorm: 362030] 12/20/2022 Take 1 capsule orally once a day. Active   Eliquis 5 mg tablet, [RxNorm: 8030721] 3/28/2024 Take 1 tablet orally 2 times a day. Active   eplerenone 25 mg tablet, [RxNorm: 106425] 1/24/2024 Take 1 tablet orally once a day. Active   ERGOCALCIFEROL 1.25 MG (34023 UT) Oral Cap, [RxNorm: 0960852] 3/29/2022 TAKE 1 CAPSULE BY MOUTH ONE TIME PER WEEK Active   furosemide 20 mg tablet, [RxNorm: 426966] 10/9/2023 Take 1 tablet orally once a day. Active   gabapentin 100 mg capsule, [RxNorm: 800693] 8/31/2023 Take 1 capsule orally 2 times a day. Active   hydrocortisone 2.5 % Rectal Cream, [RxNorm: 194486] 3/29/2022 Apply to the affected area twice daily as needed with symptoms Active   methIMAzole 5 mg tablet, [RxNorm: 869184] 4/12/2024 Take one and a half tablet orally once a day. Active   METOPROLOL SUCC ER 25 MG TAB, [RxNorm: 631871] - TAKE 1 TABLET BY  MOUTH EVERY DAY Active   omeprazole (PrilOSEC) 20 MG capsule, [RxNorm: 062925] 3/29/2022 Take 20 mg by mouth daily. Active   rosuvastatin 10 mg tablet, [RxNorm: 573709] 10/9/2023 Take 1 tablet orally once a day. Active     ASSESSMENT    83 yo female  Cardiomyopathy: Newly diminished from 9/30/21. LVEF: 20-25 %. BB, Lasix 20 mg, Eplerenone 12.5 mg. Repeat echo with improved LVEF but still not normal and gated CT coronary artery study reassuring FFRct and previously reported angiogram with normal coronaries.  9/29/21: CVA. LMCA M2 occlusion. Care at [facility],  Mitral Valve disease: Mild MR with MVP.Proceeded with follow-up echocardiogram that still showed LV depressed by reassuring gated CT coronary artery study with calcium score + FFRct.Following with Dr. Macedo and staying sinus rhythm based on MCT Oct 2022. She has had recent fall and following with Dr. Macedo and had WATCHMAN. She is off digoxin and diltiazem but still on amiodarone. Dr. Macedo discussing tikosyn, ablation or nothing; she is moving forward with ablationShe does have history of Benny's esophagus and is due to see gastroenterologist but tolerated VANDANA December 2021 with no issues. Follows with GI and had EGD early 2024.Plan:  -continue remote patient monitor for HTN and heart failure  -continue follow-up with Dr. Macedo and keep scheduled ablation procedure  -fasting CMP and lipid just prior to follow-up with Dr. Gates in 6 months  -continue current anti-hypertensive medication regimen (eplerenone 25 mg daily, furosemide 20 mg daily and Toprol XL 25 mg daily)  -Notify office of weight gain of 3 lbs overnight or 5 lbs in 1 week, lower extremity swelling, and/or shortness of breath.  -no indication for endocarditis prophylaxis with mild mitral valve prolapse with mild mitral regurgitationRecommend low sodium diet, daily exercise and maintain a normal BMI. Recommend monitor blood pressure at home.     FAMILY HISTORY    FAMILY HISTORY  Relationship  Age Diagnosis   Mother 0 Family history of heart disease - FHx     GENERAL STATUS    No data available    PAST MEDICAL HISTORY    PAST MEDICAL HISTORY  Problem Date diagonsed Date resolved Status   Pre-procedure lab exam, [SNOMED-CT: 621206578] 4/6/2022 - Active   Decreased left ventricular function, [SNOMED-CT: 617902577] 1/16/2022 - Active   Atrial fibrillation with rapid ventricular response, [SNOMED-CT: 917283879063672] 11/29/2021 - Active   Atrial fibrillation, persistent - AFib, [SNOMED-CT: 288152195] 10/15/2021 - Active   Stroke, [SNOMED-CT: 611243470] 10/15/2021 - Active   Mixed hyperlipidemia, [SNOMED-CT: 595628107] 12/6/2019 - Active   Essential hypertension, [SNOMED-CT: 00152935] 12/6/2019 - Active   Mitral valve (nonrheumatic) prolapse (MVP), [SNOMED-CT: 943556085] 12/6/2019 - Active   Frequent PVCs, [SNOMED-CT: 99797284] 1/15/2021 - Active   Nonrheumatic mitral valve regurgitation, [SNOMED-CT: 296398091] 12/6/2019 - Active   Vertigo, [SNOMED-CT: 052392301] 2/12/2019 - Active   Acute ischemic stroke, [SNOMED-CT: 696380785] 10/11/2021 - Active   Decreased left ventricular function, [SNOMED-CT: 308971810] 1/16/2022 - Active     HISTORY OF PRESENT ILLNESS    No chest pain, dyspnea, orthopnea, PND or increased lower extremity pain/swelling. No palpitations, presyncope or syncope. No fever, nausea/vomiting, diarrhea or urinary complaints. (patient of mine) reports she is more stronger and still working with PT but now working on back.Seen earlier than scheduled visit to review blood pressure and medications.  BP at home initially SBP 160s early in the week but recently -130s and DBP 50-80s.     IMMUNIZATIONS  Reconcile with Patient's ChartNo data available    PLAN OF CARE    PLAN OF CARE  Planned Care Date   Lipid panel - Fasting 1/1/1900   CMP (comprehensive metabolic panel) 1/1/1900   Referral Visit - Oliva Lai ([email or web address]) : 1/1/1900   Follow up visit -  Allen Gates MD 1/1/1900     PROCEDURES    No data available    RESULTS    RESULTS  Name Result Date Location - Ordered By   GLUCOSE [LOINC: 2339-0] 111 mg/dL [High] 04/10/2024 11:27:00 AM [facility] ([facility])  Address: [address] [place] [address]  [address]  tel:   SODIUM [LOINC: 2951-2] 138 mmol/L 04/10/2024 11:27:00 AM [facility] ([facility])  Address: [address] [place] [address]  [address]  tel:   POTASSIUM [LOINC: 2823-3] 4.1 mmol/L 04/10/2024 11:27:00 AM [facility] ([facility])  Address: [address] [place] [address]  [address]  tel:   CHLORIDE [LOINC: 2075-0] 105 mmol/L 04/10/2024 11:27:00 AM [facility] ([facility])  Address: [address] [place] [address]  [address]  tel:   CO2 [LOINC: 2028-9] 28.0 mmol/L 04/10/2024 11:27:00 AM [facility] ([facility])  Address: [address] [place] [address]  [address]  tel:   ANION GAP [LOINC: 33037-3] 5 mmol/L 04/10/2024 11:27:00 AM [facility] ([facility])  Address: [address] [place] [address]  [address]  tel:   BUN [LOINC: 6299-2] 24 mg/dL [High] 04/10/2024 11:27:00 AM [facility] ([facility])  Address: [address] [place] [address]  [address]  tel:   CREATININE [LOINC: 40434-2] 1.14 mg/dL [High] 04/10/2024 11:27:00 AM [facility] ([facility])  Address: [address] [place] [address]  tel:   CALCIUM [LOINC: 66992-1] 9.4 mg/dL 04/10/2024 11:27:00 AM [facility] ([facility])  Address: [address] [place] [address]  tel:   OSMOLALITY CALCULATED [LOINC: 18696-3] 291 mOsm/kg 04/10/2024 11:27:00 AM [facility] ([facility])  Address: [address] [place] [address]  tel:   E GFR CR [LOINC: 52043-8] 48 mL/min/1.73m2 [Low] 04/10/2024 11:27:00 AM [facility] ([facility])  Address: [facility]  [facility]  [address]  tel:   AST [LOINC: 1920-8] 29 U/L 04/10/2024 11:27:00 AM [facility] ([facility])  Address: [address] [place] [address]  [address]  tel:   ALT [LOINC: 1742-6] 48 U/L 04/10/2024 11:27:00 AM [facility] ([facility])  Address: [address] [place] [address]  [address]  tel:   ALKALINE PHOSPHATASE [LOINC: 1779-8] 74 U/L 04/10/2024 11:27:00 AM [facility] ([facility])  Address: [address] [place] [address]  [address]  tel:   BILIRUBIN, TOTAL [LOINC: 1975-2] 0.8 mg/dL 04/10/2024 11:27:00 AM [facility] ([facility])  Address: [address] [place] [address]  [address]  tel:   TOTAL PROTEIN [LOINC: 2885-2] 6.7 g/dL 04/10/2024 11:27:00 AM [facility] ([facility])  Address: [address] [place] [address]  [address]  tel:   ALBUMIN [LOINC: 1751-7] 3.5 g/dL 04/10/2024 11:27:00 AM [facility] ([facility])  Address: [address] [place] [address]  [address]  tel:   GLOBULIN [LOINC: 35065-5] 3.2 g/dL 04/10/2024 11:27:00 AM [facility] ([facility])  Address: [address] [place] [address]  [address]  tel:   A/G RATIO [LOINC: 1759-0] 1.1 04/10/2024 11:27:00 AM [facility] ([facility])  Address: [address] [place] [address]  [address]  tel:   FASTING PATIENT CMP ANSWER [LOINC: 64392-8] Yes 04/10/2024 11:27:00 AM [facility] ([facility])  Address: [address] [place] [address]  [address]  tel:   CHOLESTEROL [LOINC: 2093-3] 166 mg/dL 04/10/2024 11:27:00 AM [facility] ([facility])  Address: [address] [place] [address]  [address]  tel:   HDL CHOL [LOINC: 2085-9] 64 mg/dL [High] 04/10/2024 11:27:00 AM [facility] ([facility])  Address: [address] [place] [address]  [address]  tel:   TRIGLYCERIDES [LOINC: 2571-8] 123 mg/dL 04/10/2024  11:27:00 AM [facility] ([facility])  Address: [address] [place] [address]  [address]  tel:   LDL CHOLESTROL [LOINC: 16345-7] 80 mg/dL 04/10/2024 11:27:00 AM [facility] ([facility])  Address: [address] [place] [address]  [address]  tel:   VLDL [LOINC: 43731-6] 19 mg/dL 04/10/2024 11:27:00 AM [facility] ([facility])  Address: [address] [place] [address]  [address]  tel:   NON HDL CHOL [LOINC: 50817-6] 102 mg/dL 04/10/2024 11:27:00 AM [facility] ([facility])  Address: [address] [place] [address]  [address]  tel:   FASTING PATIENT LIPID ANSWER [LOINC: 78124284] Yes 04/10/2024 11:27:00 AM [facility] ([facility])  Address: [address] [place] [address]  [address]  tel:   POCT CREATININE [LOINC: 69877-9] 1.40 mg/dL [High] 03/19/2024 01:07:00 PM [facility] ([facility])  Address: [address] [place] [address]  [address]  tel:   E GFR CR [LOINC: 75105-4] 38 mL/min/1.73m2 [Low] 03/19/2024 01:07:00 PM [facility] ([facility])  Address: [address] [place] [address]  [address]  tel:   ANTIBODY SCREEN [LOINC: 7424440] Negative 05/18/2023 10:21:00 AM [facility] ([facility])  Address: [address] [place] [address]  [address]  tel:   ABO BLOOD TYPE [LOINC: 48184248] A 05/18/2023 10:21:00 AM [facility] ([facility])  Address: [address] [place] [address]  [address]  tel:   RH BLOOD TYPE [LOINC: 79304212] Positive 05/18/2023 10:21:00 AM [facility] ([facility])  Address: [address] [place] [address]  tel:   GLUCOSE [LOINC: 2339-0] 104 mg/dL [High] 05/18/2023 10:21:00 AM [facility] ([facility])  Address: [address] [place] [address]  [address]  tel:   SODIUM [LOINC: 2951-2] 139 mmol/L 05/18/2023  10:21:00 AM [facility] ([facility])  Address: [address] [place] [address]  [address]  tel:   POTASSIUM [LOINC: 2823-3] 3.8 mmol/L 05/18/2023 10:21:00 AM [facility] ([facility])  Address: [address] [place] [address]  [address]  tel:   CHLORIDE [LOINC: 2075-0] 106 mmol/L 05/18/2023 10:21:00 AM [facility] ([facility])  Address: [address] [place] [address]  [address]  tel:   CO2 [LOINC: 2028-9] 30.0 mmol/L 05/18/2023 10:21:00 AM [facility] ([facility])  Address: [address] [place] [address]  tel:   ANION GAP [LOINC: 33037-3] 3 mmol/L 05/18/2023 10:21:00 AM [facility] ([facility])  Address: [address] [place] [address]  [address]  tel:   BUN [LOINC: 6299-2] 27 mg/dL [High] 05/18/2023 10:21:00 AM [facility] ([facility])  Address: [address] [place] [address]  [address]  tel:   CREATININE [LOINC: 03977-6] 1.13 mg/dL [High] 05/18/2023 10:21:00 AM [facility] ([facility])  Address: [address] [place] [address]  [address]  tel:   CALCIUM [LOINC: 21380-6] 9.2 mg/dL 05/18/2023 10:21:00 AM [facility] ([facility])  Address: [address] [place] [address]  [address]  tel:   OSMOLALITY CALCULATED [LOINC: 87637-1] 293 mOsm/kg 05/18/2023 10:21:00 AM [facility] ([facility])  Address: [address] [place] [address]  tel:   E GFR CR [LOINC: 23051-9] 49 mL/min/1.73m2 [Low] 05/18/2023 10:21:00 AM [facility] ([facility])  Address: [address] [place], [place] [address]  tel:   FASTING PATIENT BMP ANSWER [LOINC: 19578-8] Yes 05/18/2023 10:21:00 AM [facility] ([facility])  Address: [address] [place] [address]  tel:   PT [LOINC: 5902-2] 24.4 seconds [High] 05/18/2023 10:21:00 AM [facility] ([facility]  [facility])  Address: [address] [place] [address]  [address]  tel:   INR [LOINC: 98218-7] 2.23 [High] 05/18/2023 10:21:00 AM [facility] ([facility])  Address: [address] [place] [address]  [address]  tel:   WBC [LOINC: 6690-2] 7.0 x10(3) uL 05/18/2023 10:21:00 AM [facility] ([facility])  Address: [address] [place] [address]  [address]  tel:   RED BLOOD COUNT [LOINC: 789-8] 4.15 x10(6)uL 05/18/2023 10:21:00 AM [facility] ([facility])  Address: [address] [place] [address]  [address]  tel:   HGB [LOINC: 718-7] 12.0 g/dL 05/18/2023 10:21:00 AM [facility] ([facility])  Address: [address] [place] [address]  [address]  tel:   HCT [LOINC: 4544-3] 37.7 % 05/18/2023 10:21:00 AM [facility] ([facility])  Address: [address] [place] [address]  [address]  tel:   PLATELETS [LOINC: 777-3] 269.0 10(3)uL 05/18/2023 10:21:00 AM [facility] ([facility])  Address: [address] [place] [address]  [address]  tel:   MEAN CELL VOLUME [LOINC: 787-2] 90.8 fL 05/18/2023 10:21:00 AM [facility] ([facility])  Address: [address] [place] [address]  [address]  tel:   MEAN CORPUSCULAR HEMOGLOBIN [LOINC: 785-6] 28.9 pg 05/18/2023 10:21:00 AM [facility] ([facility])  Address: [address] [place] [address]  [address]  tel:   MEAN CORPUSCULAR HGB CONC [LOINC: 786-4] 31.8 g/dL 05/18/2023 10:21:00 AM [facility] ([facility])  Address: [address] [place] [address]  [address]  tel:   RED CELL DISTRIBUTION WIDTH CV [LOINC: 788-0] 13.6 % 05/18/2023 10:21:00 AM [facility] ([facility])  Address: [address] [place] [address]  [address]  tel:   INR [LOINC: INR] 2.23 RATIO [Normal] 05/18/2023 12:00:00 AM   Address: tel:   POC INR [LOINC: 55627-8] 2.50 [High] 05/01/2023 12:46:07 PM [facility]  ([facility])  Address: [address] [place] [address]  [address]  tel:   Lipid panel - Fasting [LOINC: 89366-7] Pending 6 Months     CMP (comprehensive metabolic panel) [LOINC: 84138-6] Pending 6 Months     Trans Thoracic Echocardiogram 1.The study quality is good. 2.The left ventricle is mildly enlarged. Global left ventricular systolic function is normal. The left ventricle diastolic function is impaired (Grade I) with normal left atrial pressure. The wall thickness is within normal limits. The left ventricular ejection fraction is 55-60%. No regional wall motion abnormality is noted.3.The right ventricle is normal in size. Right ventricular systolic function is normal. Tapse=1.64cms.4.The left atrium is moderately enlarged based on the left atrium volume index of 44.6ml/m².5.Mild mitral annular calcification is noted. Mild (1+) mitral regurgitation. Mild mitral valve prolapse is noted. 6.Focal thickening of the aortic valve cusps is noted. Mild (1+) aortic regurgitation. 4/4/2024 12:30:00 PM Allen Gates MD   Ambulatory Telemetry Monitor 1.This is a good quality study. 2.Predominant rhythm is sinus bradycardia. 3.The minimum heart rate recorded was 42 beats / minute. The maximum heart rate is 93 beats / minute. The mean heart rate is 50 beats / minute. 4.A FIB [place] 0% 10/14/2022 2:00:00 PM Lourdes Gillette MD   Holter Monitoring 1.This is a good quality study. 2.Predominant rhythm is atrial fibrillation. 3.The minimum heart rate recorded was 72 beats / minute (10/15/2021). The maximum heart rate is 154 beats / minute (10/16/2021). The mean heart rate is 98 beats / minute. 4.*The predominant rhythm was sustained atrial fibrillation with occurrences of RVR. *The Maximum Heart Rate recorded was 154 bpm, the Minimum Heart Rate recorded was 72 bpm, and the Average Heart Rate was 98 bpm. *There were rare PVCs. *There were 2 Patient triggered events correlating with AF, rates in the 90s.. 10/15/2021 11:00:00 AM  Lourdes Gillette MD     REVIEW OF SYSTEMS    REVIEW OF SYSTEMS  Header Details   Cardiovascular No history of Chest pain, KIM, Palpitations, Syncope, PND, Orthopnea, Edema, Claudication     SOCIAL HISTORY    SOCIAL HISTORY  Social History Element Description Effective Dates   Smoking status Never smoked -     FUNCTIONAL STATUS    No data available    MEDICAL EQUIPMENT    No data available    Goals Sections    No data available    REASON FOR REFERRAL                   Health Concerns Section    No data available    COGNITIVE/MENTAL STATUS    No data available    Patient Demographics    Patient Demographics  Patient Address Patient Name Communication   [address] [place] [address] [place] [address] Kelly Bettencourt [phone] (Home)     Patient Demographics  Language Race / Ethnicity Marital Status   Unknown - Spoken White / Unknown      Document Information    Primary Care Provider Other Service Providers Document Coverage Dates   Allen Gates  NPI: [id number]  [phone] (Work)  [address] [place] [address]  [place] [address]  Interpreting Physicians  [facility] [place]  [phone] (Work)  [address] [place] [address] Madeleine Ferguson  NPI: [id number]  [phone] (Work)  [address] [place] [address]  [place] [address]  Nurses     Obdulia Barbour  NPI: [id number]  [phone] (Work)  [address] [place] [address]  [place] [address]  Nurses     Alexandra Candelario  NPI: [id number]  [phone] (Work)  [address] [place] [id number]  [place] [address]  Nurses     Mely Allen  NPI: [id number]  [phone] (Work)  [address] [place] [id number]  [place] [address]  Nurses     Nahed Esposito  NPI: [id number]  [phone] (Work)  [address] [place] [id number]  [place]  [address]  Nurses Apr. 12, 2024April 12, 2024      Organization   [facility]  [phone] (Work)  [address] [place] [address] [id number] [place] [id number]     Encounter Providers Encounter Date    Apr. 12, 2024April 12, 2024     Legal Authenticator    Allen Gates  NPI: [id number]  [phone] (Work)  [address] [place] [address], [address] [place] [address] [id number] [place] [id number]

## 2024-08-22 NOTE — ED QUICK NOTES
Rounding Completed    Plan of Care reviewed. Waiting for ED Physician evaluation.  Elimination needs assessed.  Provided pillow and warm blankets.    Bed is locked and in lowest position. Call light within reach.

## 2024-08-22 NOTE — PLAN OF CARE
NURSING ADMISSION NOTE      Patient admitted via Cart  Oriented to room.  Safety precautions initiated.  Bed in low position.  Call light in reach.    Pt received from ER accompanied by her  Mo. Pt is axox4-forgetful with short term memory loss from prior stroke. . Denies chest pain at present time. States she is occasionally belching and then has pain with belching. Sinus Junior on tele. Assessment completed see flowsheet. Home medications reviewed with pt and . Med rec completed. Admission navigator completed. Skin check completed with Leni carrillo, skin intact. Page out to Cleopatra Perez for new consult and Dr. Kerri De Luna to notify of admission and med rec differences to review. Updated pt and  on poc for the night and am. Call light within reach. Fall precautions in place. All needs met at this time, call light and personal items within reach.

## 2024-10-01 ENCOUNTER — LAB ENCOUNTER (OUTPATIENT)
Dept: LAB | Facility: HOSPITAL | Age: 84
End: 2024-10-01
Attending: INTERNAL MEDICINE
Payer: MEDICARE

## 2024-10-01 DIAGNOSIS — E78.2 MIXED HYPERLIPIDEMIA: ICD-10-CM

## 2024-10-01 DIAGNOSIS — I10 HYPERTENSION, ESSENTIAL: Primary | ICD-10-CM

## 2024-10-01 LAB
ALBUMIN SERPL-MCNC: 4.2 G/DL (ref 3.2–4.8)
ALBUMIN/GLOB SERPL: 1.6 {RATIO} (ref 1–2)
ALP LIVER SERPL-CCNC: 72 U/L
ALT SERPL-CCNC: 15 U/L
ANION GAP SERPL CALC-SCNC: 1 MMOL/L (ref 0–18)
AST SERPL-CCNC: 17 U/L (ref ?–34)
BILIRUB SERPL-MCNC: 0.8 MG/DL (ref 0.2–1.1)
BUN BLD-MCNC: 18 MG/DL (ref 9–23)
CALCIUM BLD-MCNC: 10.2 MG/DL (ref 8.7–10.4)
CHLORIDE SERPL-SCNC: 105 MMOL/L (ref 98–112)
CHOLEST SERPL-MCNC: 140 MG/DL (ref ?–200)
CO2 SERPL-SCNC: 32 MMOL/L (ref 21–32)
CREAT BLD-MCNC: 0.89 MG/DL
EGFRCR SERPLBLD CKD-EPI 2021: 65 ML/MIN/1.73M2 (ref 60–?)
FASTING PATIENT LIPID ANSWER: YES
FASTING STATUS PATIENT QL REPORTED: YES
GLOBULIN PLAS-MCNC: 2.6 G/DL (ref 2–3.5)
GLUCOSE BLD-MCNC: 95 MG/DL (ref 70–99)
HDLC SERPL-MCNC: 54 MG/DL (ref 40–59)
LDLC SERPL CALC-MCNC: 68 MG/DL (ref ?–100)
NONHDLC SERPL-MCNC: 86 MG/DL (ref ?–130)
OSMOLALITY SERPL CALC.SUM OF ELEC: 288 MOSM/KG (ref 275–295)
POTASSIUM SERPL-SCNC: 4.1 MMOL/L (ref 3.5–5.1)
PROT SERPL-MCNC: 6.8 G/DL (ref 5.7–8.2)
SODIUM SERPL-SCNC: 138 MMOL/L (ref 136–145)
TRIGL SERPL-MCNC: 98 MG/DL (ref 30–149)
VLDLC SERPL CALC-MCNC: 15 MG/DL (ref 0–30)

## 2024-10-01 PROCEDURE — 36415 COLL VENOUS BLD VENIPUNCTURE: CPT

## 2024-10-01 PROCEDURE — 80061 LIPID PANEL: CPT

## 2024-10-01 PROCEDURE — 80053 COMPREHEN METABOLIC PANEL: CPT

## 2024-12-12 ENCOUNTER — LAB ENCOUNTER (OUTPATIENT)
Dept: LAB | Facility: HOSPITAL | Age: 84
End: 2024-12-12
Attending: NURSE PRACTITIONER
Payer: MEDICARE

## 2024-12-12 DIAGNOSIS — I10 ESSENTIAL HYPERTENSION, MALIGNANT: Primary | ICD-10-CM

## 2024-12-12 LAB
ANION GAP SERPL CALC-SCNC: 8 MMOL/L (ref 0–18)
BUN BLD-MCNC: 22 MG/DL (ref 9–23)
CALCIUM BLD-MCNC: 10 MG/DL (ref 8.7–10.4)
CHLORIDE SERPL-SCNC: 101 MMOL/L (ref 98–112)
CO2 SERPL-SCNC: 30 MMOL/L (ref 21–32)
CREAT BLD-MCNC: 1 MG/DL
EGFRCR SERPLBLD CKD-EPI 2021: 56 ML/MIN/1.73M2 (ref 60–?)
FASTING STATUS PATIENT QL REPORTED: NO
GLUCOSE BLD-MCNC: 85 MG/DL (ref 70–99)
OSMOLALITY SERPL CALC.SUM OF ELEC: 291 MOSM/KG (ref 275–295)
POTASSIUM SERPL-SCNC: 4 MMOL/L (ref 3.5–5.1)
SODIUM SERPL-SCNC: 139 MMOL/L (ref 136–145)

## 2024-12-12 PROCEDURE — 80048 BASIC METABOLIC PNL TOTAL CA: CPT

## 2024-12-12 PROCEDURE — 36415 COLL VENOUS BLD VENIPUNCTURE: CPT

## 2025-06-03 ENCOUNTER — TELEPHONE (OUTPATIENT)
Dept: CARDIOLOGY CLINIC | Facility: HOSPITAL | Age: 85
End: 2025-06-03

## (undated) NOTE — LETTER
[facility] [address], [address]  Consent for Procedure/Sedation    Date:     Time:       1. I authorize the performance upon Saeed Dunn the following:  Transesophageal Echocardiogram and cardioversion    2.  I authorize Dr. Luz Maria Pope 1/16/1940       Medical Record #: [id number]

## (undated) NOTE — LETTER
[facility] [address], [address] [place] [address]  Consent for Procedure/Sedation    Date: ***    Time: ***      {edw ivs consent:3946}